# Patient Record
Sex: MALE | Race: WHITE | NOT HISPANIC OR LATINO | Employment: FULL TIME | URBAN - METROPOLITAN AREA
[De-identification: names, ages, dates, MRNs, and addresses within clinical notes are randomized per-mention and may not be internally consistent; named-entity substitution may affect disease eponyms.]

---

## 2017-04-19 ENCOUNTER — TELEPHONE (OUTPATIENT)
Dept: MEDICAL GROUP | Facility: PHYSICIAN GROUP | Age: 57
End: 2017-04-19

## 2017-04-19 DIAGNOSIS — E55.9 VITAMIN D DEFICIENCY DISEASE: ICD-10-CM

## 2017-04-19 DIAGNOSIS — R73.9 HYPERGLYCEMIA: ICD-10-CM

## 2017-04-19 DIAGNOSIS — Z00.00 ROUTINE HEALTH MAINTENANCE: ICD-10-CM

## 2017-04-19 DIAGNOSIS — Z12.5 SCREENING FOR PROSTATE CANCER: ICD-10-CM

## 2017-04-19 DIAGNOSIS — E78.2 MIXED HYPERLIPIDEMIA: ICD-10-CM

## 2017-04-19 DIAGNOSIS — I10 ESSENTIAL HYPERTENSION: ICD-10-CM

## 2017-04-19 NOTE — TELEPHONE ENCOUNTER
1. Caller Name: Milton Beal                                         Call Back Number: [phone] (home)       Patient approves a detailed voicemail message: N\A    2. SPECIFIC Action To Be Taken: Blood work    3. Diagnosis/Clinical Reason for Request: Next visit in June    4. Specialty & Provider Name/Lab/Imaging Location: [facility]    5. Is appointment scheduled for requested order/referral: no    Patient informed they will receive a return phone call from the office ONLY if there are any questions before processing their request. Advised to call back if they haven't received a call from the referral department in 5 days.

## 2017-06-17 ENCOUNTER — HOSPITAL ENCOUNTER (OUTPATIENT)
Dept: LAB | Facility: MEDICAL CENTER | Age: 57
End: 2017-06-17
Attending: FAMILY MEDICINE
Payer: COMMERCIAL

## 2017-06-17 DIAGNOSIS — E78.2 MIXED HYPERLIPIDEMIA: ICD-10-CM

## 2017-06-17 DIAGNOSIS — R73.9 HYPERGLYCEMIA: ICD-10-CM

## 2017-06-17 DIAGNOSIS — I10 ESSENTIAL HYPERTENSION: ICD-10-CM

## 2017-06-17 DIAGNOSIS — Z00.00 ROUTINE HEALTH MAINTENANCE: ICD-10-CM

## 2017-06-17 LAB
ALBUMIN SERPL BCP-MCNC: 4.6 G/DL (ref 3.2–4.9)
ALBUMIN/GLOB SERPL: 1.6 G/DL
ALP SERPL-CCNC: 81 U/L (ref 30–99)
ALT SERPL-CCNC: 32 U/L (ref 2–50)
ANION GAP SERPL CALC-SCNC: 7 MMOL/L (ref 0–11.9)
AST SERPL-CCNC: 24 U/L (ref 12–45)
BASOPHILS # BLD AUTO: 0.9 % (ref 0–1.8)
BASOPHILS # BLD: 0.05 K/UL (ref 0–0.12)
BILIRUB SERPL-MCNC: 0.7 MG/DL (ref 0.1–1.5)
BUN SERPL-MCNC: 13 MG/DL (ref 8–22)
CALCIUM SERPL-MCNC: 9.6 MG/DL (ref 8.5–10.5)
CHLORIDE SERPL-SCNC: 104 MMOL/L (ref 96–112)
CHOLEST SERPL-MCNC: 159 MG/DL (ref 100–199)
CO2 SERPL-SCNC: 28 MMOL/L (ref 20–33)
CREAT SERPL-MCNC: 1.05 MG/DL (ref 0.5–1.4)
CREAT UR-MCNC: 173.2 MG/DL
EOSINOPHIL # BLD AUTO: 0.07 K/UL (ref 0–0.51)
EOSINOPHIL NFR BLD: 1.2 % (ref 0–6.9)
ERYTHROCYTE [DISTWIDTH] IN BLOOD BY AUTOMATED COUNT: 40.9 FL (ref 35.9–50)
EST. AVERAGE GLUCOSE BLD GHB EST-MCNC: 169 MG/DL
GFR SERPL CREATININE-BSD FRML MDRD: >60 ML/MIN/1.73 M 2
GLOBULIN SER CALC-MCNC: 2.8 G/DL (ref 1.9–3.5)
GLUCOSE SERPL-MCNC: 148 MG/DL (ref 65–99)
HBA1C MFR BLD: 7.5 % (ref 0–5.6)
HCT VFR BLD AUTO: 45.2 % (ref 42–52)
HDLC SERPL-MCNC: 55 MG/DL
HGB BLD-MCNC: 14.9 G/DL (ref 14–18)
IMM GRANULOCYTES # BLD AUTO: 0.03 K/UL (ref 0–0.11)
IMM GRANULOCYTES NFR BLD AUTO: 0.5 % (ref 0–0.9)
LDLC SERPL CALC-MCNC: 70 MG/DL
LYMPHOCYTES # BLD AUTO: 1.69 K/UL (ref 1–4.8)
LYMPHOCYTES NFR BLD: 29.2 % (ref 22–41)
MCH RBC QN AUTO: 29.7 PG (ref 27–33)
MCHC RBC AUTO-ENTMCNC: 33 G/DL (ref 33.7–35.3)
MCV RBC AUTO: 90 FL (ref 81.4–97.8)
MICROALBUMIN UR-MCNC: 2.3 MG/DL
MICROALBUMIN/CREAT UR: 13 MG/G (ref 0–30)
MONOCYTES # BLD AUTO: 0.5 K/UL (ref 0–0.85)
MONOCYTES NFR BLD AUTO: 8.7 % (ref 0–13.4)
NEUTROPHILS # BLD AUTO: 3.44 K/UL (ref 1.82–7.42)
NEUTROPHILS NFR BLD: 59.5 % (ref 44–72)
NRBC # BLD AUTO: 0 K/UL
NRBC BLD AUTO-RTO: 0 /100 WBC
PLATELET # BLD AUTO: 179 K/UL (ref 164–446)
PMV BLD AUTO: 12 FL (ref 9–12.9)
POTASSIUM SERPL-SCNC: 4.6 MMOL/L (ref 3.6–5.5)
PROT SERPL-MCNC: 7.4 G/DL (ref 6–8.2)
RBC # BLD AUTO: 5.02 M/UL (ref 4.7–6.1)
SODIUM SERPL-SCNC: 139 MMOL/L (ref 135–145)
TRIGL SERPL-MCNC: 172 MG/DL (ref 0–149)
TSH SERPL DL<=0.005 MIU/L-ACNC: 1.57 UIU/ML (ref 0.3–3.7)
WBC # BLD AUTO: 5.8 K/UL (ref 4.8–10.8)

## 2017-06-17 PROCEDURE — 83036 HEMOGLOBIN GLYCOSYLATED A1C: CPT

## 2017-06-17 PROCEDURE — 85025 COMPLETE CBC W/AUTO DIFF WBC: CPT

## 2017-06-17 PROCEDURE — 80053 COMPREHEN METABOLIC PANEL: CPT

## 2017-06-17 PROCEDURE — 82043 UR ALBUMIN QUANTITATIVE: CPT

## 2017-06-17 PROCEDURE — 82570 ASSAY OF URINE CREATININE: CPT

## 2017-06-17 PROCEDURE — 80061 LIPID PANEL: CPT

## 2017-06-17 PROCEDURE — 36415 COLL VENOUS BLD VENIPUNCTURE: CPT

## 2017-06-17 PROCEDURE — 84443 ASSAY THYROID STIM HORMONE: CPT

## 2017-06-27 ENCOUNTER — OFFICE VISIT (OUTPATIENT)
Dept: MEDICAL GROUP | Facility: PHYSICIAN GROUP | Age: 57
End: 2017-06-27
Payer: COMMERCIAL

## 2017-06-27 VITALS
BODY MASS INDEX: 31.08 KG/M2 | DIASTOLIC BLOOD PRESSURE: 64 MMHG | RESPIRATION RATE: 14 BRPM | SYSTOLIC BLOOD PRESSURE: 138 MMHG | HEART RATE: 78 BPM | HEIGHT: 67 IN | OXYGEN SATURATION: 96 % | WEIGHT: 198 LBS | TEMPERATURE: 97.5 F

## 2017-06-27 DIAGNOSIS — E78.2 MIXED HYPERLIPIDEMIA: ICD-10-CM

## 2017-06-27 DIAGNOSIS — E55.9 VITAMIN D DEFICIENCY DISEASE: ICD-10-CM

## 2017-06-27 DIAGNOSIS — R73.9 HYPERGLYCEMIA: ICD-10-CM

## 2017-06-27 DIAGNOSIS — I10 ESSENTIAL HYPERTENSION: ICD-10-CM

## 2017-06-27 PROCEDURE — 99214 OFFICE O/P EST MOD 30 MIN: CPT | Performed by: FAMILY MEDICINE

## 2017-06-28 NOTE — PROGRESS NOTES
Patient comes in to review recent lab work. He admits to eating more sugar and carbohydrates in the evenings and he knows he is supposed to. He says he feels well.  He wants to go with increased exercise and dietary changes as much as possible. He would like to see or dietitian here but is not ready to go to a formal diabetes education program right now he says.      Review of Systems   Constitutional: Negative.  Negative for fever, chills, weight loss and malaise/fatigue.   HENT: Negative for hearing loss, ear pain, congestion, sore throat, neck pain and tinnitus.    Eyes: Negative for blurred vision, double vision and pain.   Respiratory: Negative for cough, hemoptysis, shortness of breath and wheezing.    Cardiovascular: Negative for chest pain, palpitations, orthopnea, claudication, leg swelling and PND.   Gastrointestinal: Negative for heartburn, nausea, vomiting, abdominal pain, diarrhea, constipation, blood in stool and melena.   Genitourinary: Negative for incontinence, dysuria, urgency, frequency and hematuria. Male--negative for erectile dysfunction  Musculoskeletal: Negative for myalgias, back pain and joint pain.   Skin: Negative for rash and itching. No lesions that will not heal.  Neurological: Negative for dizziness, tingling, tremors, focal weakness, seizures, loss of consciousness and headaches.   Endo/Heme/Allergies: Negative for environmental allergies and polydipsia.  Does not bruise/bleed easily.   Psychiatric/Behavioral: Negative for depression, memory loss and substance abuse.  The patient is not nervous/anxious and does not have insomnia.  All others negative.     I reviewed the following    Past Medical History   Diagnosis Date   • Hypertension    • Hyperlipidemia         Past Surgical History   Procedure Laterality Date   • Elbowplasty         No Known Allergies    Current Outpatient Prescriptions   Medication Sig Dispense Refill   • metformin (GLUCOPHAGE) 500 MG Tab Take 1 Tab by mouth 2  times a day, with meals. 180 Tab 3   • atorvastatin (LIPITOR) 20 MG Tab TAKE 1 TABLET BY MOUTH EVERY NIGHT AT BEDTIME 90 Tab 3   • valsartan (DIOVAN) 320 MG tablet Take 1 Tab by mouth every day. 90 Tab 3   • vitamin D3, cholecalciferol, 1000 UNIT TABS Take 2 Tabs by mouth every day. 100 Tab 3   • sildenafil citrate (VIAGRA) 100 MG tablet Take 1 Tab by mouth as needed for Erectile Dysfunction. 20 Tab 3     No current facility-administered medications for this visit.        Family History   Problem Relation Age of Onset   • Cancer Father      Prostate Cancer   • Lung Disease Father      COPD       Social History     Social History   • Marital Status:      Spouse Name: N/A   • Number of Children: N/A   • Years of Education: N/A     Occupational History   • Not on file.     Social History Main Topics   • Smoking status: Never Smoker    • Smokeless tobacco: Never Used   • Alcohol Use: 3.5 oz/week     7 Glasses of wine per week   • Drug Use: No   • Sexual Activity:     Partners: Female     Other Topics Concern   • Not on file     Social History Narrative          Hospital Outpatient Visit on 06/17/2017   Component Date Value   • WBC 06/17/2017 5.8    • RBC 06/17/2017 5.02    • Hemoglobin 06/17/2017 14.9    • Hematocrit 06/17/2017 45.2    • MCV 06/17/2017 90.0    • MCH 06/17/2017 29.7    • MCHC 06/17/2017 33.0*   • RDW 06/17/2017 40.9    • Platelet Count 06/17/2017 179    • MPV 06/17/2017 12.0    • Neutrophils-Polys 06/17/2017 59.50    • Lymphocytes 06/17/2017 29.20    • Monocytes 06/17/2017 8.70    • Eosinophils 06/17/2017 1.20    • Basophils 06/17/2017 0.90    • Immature Granulocytes 06/17/2017 0.50    • Nucleated RBC 06/17/2017 0.00    • Neutrophils (Absolute) 06/17/2017 3.44    • Lymphs (Absolute) 06/17/2017 1.69    • Monos (Absolute) 06/17/2017 0.50    • Eos (Absolute) 06/17/2017 0.07    • Baso (Absolute) 06/17/2017 0.05    • Immature Granulocytes (a* 06/17/2017 0.03    • NRBC (Absolute) 06/17/2017 0.00    •  Sodium 06/17/2017 139    • Potassium 06/17/2017 4.6    • Chloride 06/17/2017 104    • Co2 06/17/2017 28    • Anion Gap 06/17/2017 7.0    • Glucose 06/17/2017 148*   • Bun 06/17/2017 13    • Creatinine 06/17/2017 1.05    • Calcium 06/17/2017 9.6    • AST(SGOT) 06/17/2017 24    • ALT(SGPT) 06/17/2017 32    • Alkaline Phosphatase 06/17/2017 81    • Total Bilirubin 06/17/2017 0.7    • Albumin 06/17/2017 4.6    • Total Protein 06/17/2017 7.4    • Globulin 06/17/2017 2.8    • A-G Ratio 06/17/2017 1.6    • Glycohemoglobin 06/17/2017 7.5*   • Est Avg Glucose 06/17/2017 169    • Creatinine, Urine 06/17/2017 173.20    • Microalbumin, Urine Rand* 06/17/2017 2.3    • Micro Alb Creat Ratio 06/17/2017 13    • Cholesterol,Tot 06/17/2017 159    • Triglycerides 06/17/2017 172*   • HDL 06/17/2017 55    • LDL 06/17/2017 70    • TSH 06/17/2017 1.570    • GFR If  06/17/2017 >60    • GFR If Non  Ameri* 06/17/2017 >60      Physical Exam   Constitutional: He is oriented. He appears well-developed and well-nourished. No distress.   HENT:   Head: Normocephalic and atraumatic.   Right Ear: External ear normal. Ear canal and TM normal   Left Ear: External ear normal. Ear canal and TM normal   Nose: Nose normal.   Mouth/Throat: Oropharynx is clear and moist.   Eyes: Conjunctivae and extraocular motions are normal. Pupils are equal, round, and reactive to light.        Fundi benign bilaterally   Neck: No thyromegaly present.   Cardiovascular: Normal rate, regular rhythm, normal heart sounds and intact distal pulses.  Exam reveals no gallop.    No murmur heard.  Pulmonary/Chest: Effort normal and breath sounds normal. No respiratory distress. He has no wheezes. He has no rales.   Abdominal: Soft. Bowel sounds are normal. No hepatosplenomegaly. He exhibits no distension. No tenderness. He has no rebound and no guarding.   Musculoskeletal: Normal range of motion. He exhibits no edema and no tenderness.   Lymphadenopathy:      He has no cervical adenopathy.  No supraclavicular adenopathy.   Neurological: He is alert and oriented. He has normal reflexes.        Babinskis downgoing bilaterally--The patient has intact sensation to monofilament light touch over both feet. No sores or ulcers are noted over the feet.     Skin: Skin is warm and dry. No rash noted. No erythema.   Psychiatric: He has a normal mood and appropriate affect. His behavior is normal. Judgment and thought content normal.      1. Hyperglycemia --we need better control of this  metformin (GLUCOPHAGE) 500 MG Tab--increase to one by mouth twice a day with food     REFERRAL TO [facility] IMPROVEMENT PROGRAMS (HIP) Services Requested:: Registered Dietitian for Medical Nutrition Therapy; Reason for Visit:: Medical Condition Requiring Nutrition Counseling    Diabetic Monofilament LE Exam   2. Essential hypertension   controlled    3. Mixed hyperlipidemia   doing well    4. Vitamin D deficiency disease   doing well      Recheck with me 3 months or when necessary    Please note that this dictation was created using voice recognition software. I have worked with consultants from the vendor as well as technical experts from [employer] to optimize the interface. I have made every reasonable attempt to correct obvious errors, but I expect that there are errors of grammar and possibly content that I did not discover before finalizing the note.

## 2017-06-28 NOTE — PATIENT INSTRUCTIONS
Patient given written instructions regarding labs, imaging, medications, referrals, dietary and lifestyle management, and return visit.    Figueroa Lacey MD

## 2017-07-31 DIAGNOSIS — I10 ESSENTIAL HYPERTENSION: ICD-10-CM

## 2017-08-01 RX ORDER — VALSARTAN 320 MG/1
320 TABLET ORAL DAILY
Qty: 90 TAB | Refills: 3 | Status: SHIPPED | OUTPATIENT
Start: 2017-08-01 | End: 2018-05-23 | Stop reason: SDUPTHER

## 2017-10-08 DIAGNOSIS — E78.2 MIXED HYPERLIPIDEMIA: ICD-10-CM

## 2017-10-08 RX ORDER — ATORVASTATIN CALCIUM 20 MG/1
TABLET, FILM COATED ORAL
Qty: 90 TAB | Refills: 3 | Status: SHIPPED | OUTPATIENT
Start: 2017-10-08 | End: 2018-07-11 | Stop reason: SDUPTHER

## 2018-01-29 DIAGNOSIS — J11.1 FLU: ICD-10-CM

## 2018-01-29 RX ORDER — OSELTAMIVIR PHOSPHATE 75 MG/1
75 CAPSULE ORAL 2 TIMES DAILY
Qty: 10 CAP | Refills: 0 | Status: SHIPPED | OUTPATIENT
Start: 2018-01-29 | End: 2018-07-11

## 2018-05-15 DIAGNOSIS — E55.9 VITAMIN D DEFICIENCY DISEASE: ICD-10-CM

## 2018-05-15 DIAGNOSIS — I10 ESSENTIAL HYPERTENSION: ICD-10-CM

## 2018-05-15 DIAGNOSIS — R73.9 HYPERGLYCEMIA: ICD-10-CM

## 2018-05-15 DIAGNOSIS — E78.2 MIXED HYPERLIPIDEMIA: ICD-10-CM

## 2018-05-15 DIAGNOSIS — Z12.5 PROSTATE CANCER SCREENING: ICD-10-CM

## 2018-05-23 DIAGNOSIS — I10 ESSENTIAL HYPERTENSION: ICD-10-CM

## 2018-05-24 RX ORDER — VALSARTAN 320 MG/1
TABLET ORAL
Qty: 90 TAB | Refills: 2 | Status: SHIPPED | OUTPATIENT
Start: 2018-05-24 | End: 2019-04-05

## 2018-05-24 NOTE — TELEPHONE ENCOUNTER
Requested Prescriptions     Signed Prescriptions Disp Refills   • valsartan (DIOVAN) 320 MG tablet 90 Tab 2     Sig: TAKE 1 TABLET BY MOUTH EVERY DAY     Authorizing Provider: GORDON RAMESH A.P.R.N.

## 2018-06-09 ENCOUNTER — HOSPITAL ENCOUNTER (OUTPATIENT)
Dept: LAB | Facility: MEDICAL CENTER | Age: 58
End: 2018-06-09
Attending: FAMILY MEDICINE
Payer: COMMERCIAL

## 2018-06-09 DIAGNOSIS — I10 ESSENTIAL HYPERTENSION: ICD-10-CM

## 2018-06-09 DIAGNOSIS — E78.2 MIXED HYPERLIPIDEMIA: ICD-10-CM

## 2018-06-09 DIAGNOSIS — Z12.5 PROSTATE CANCER SCREENING: ICD-10-CM

## 2018-06-09 DIAGNOSIS — R73.9 HYPERGLYCEMIA: ICD-10-CM

## 2018-06-09 LAB
25(OH)D3 SERPL-MCNC: 23 NG/ML (ref 30–100)
ALBUMIN SERPL BCP-MCNC: 4.7 G/DL (ref 3.2–4.9)
ALBUMIN/GLOB SERPL: 1.6 G/DL
ALP SERPL-CCNC: 85 U/L (ref 30–99)
ALT SERPL-CCNC: 50 U/L (ref 2–50)
AMORPH CRY #/AREA URNS HPF: PRESENT /HPF
ANION GAP SERPL CALC-SCNC: 10 MMOL/L (ref 0–11.9)
APPEARANCE UR: ABNORMAL
AST SERPL-CCNC: 38 U/L (ref 12–45)
BACTERIA #/AREA URNS HPF: NEGATIVE /HPF
BASOPHILS # BLD AUTO: 0.8 % (ref 0–1.8)
BASOPHILS # BLD: 0.05 K/UL (ref 0–0.12)
BILIRUB SERPL-MCNC: 1 MG/DL (ref 0.1–1.5)
BILIRUB UR QL STRIP.AUTO: NEGATIVE
BUN SERPL-MCNC: 12 MG/DL (ref 8–22)
CALCIUM SERPL-MCNC: 9.9 MG/DL (ref 8.5–10.5)
CHLORIDE SERPL-SCNC: 103 MMOL/L (ref 96–112)
CHOLEST SERPL-MCNC: 158 MG/DL (ref 100–199)
CO2 SERPL-SCNC: 27 MMOL/L (ref 20–33)
COLOR UR: ABNORMAL
CREAT SERPL-MCNC: 0.97 MG/DL (ref 0.5–1.4)
CREAT UR-MCNC: 450 MG/DL
EOSINOPHIL # BLD AUTO: 0.04 K/UL (ref 0–0.51)
EOSINOPHIL NFR BLD: 0.6 % (ref 0–6.9)
EPI CELLS #/AREA URNS HPF: NORMAL /HPF
ERYTHROCYTE [DISTWIDTH] IN BLOOD BY AUTOMATED COUNT: 40.7 FL (ref 35.9–50)
EST. AVERAGE GLUCOSE BLD GHB EST-MCNC: 160 MG/DL
GLOBULIN SER CALC-MCNC: 3 G/DL (ref 1.9–3.5)
GLUCOSE SERPL-MCNC: 165 MG/DL (ref 65–99)
GLUCOSE UR STRIP.AUTO-MCNC: NEGATIVE MG/DL
HBA1C MFR BLD: 7.2 % (ref 0–5.6)
HCT VFR BLD AUTO: 47.1 % (ref 42–52)
HDLC SERPL-MCNC: 55 MG/DL
HGB BLD-MCNC: 16 G/DL (ref 14–18)
IMM GRANULOCYTES # BLD AUTO: 0.02 K/UL (ref 0–0.11)
IMM GRANULOCYTES NFR BLD AUTO: 0.3 % (ref 0–0.9)
KETONES UR STRIP.AUTO-MCNC: ABNORMAL MG/DL
LDLC SERPL CALC-MCNC: 71 MG/DL
LEUKOCYTE ESTERASE UR QL STRIP.AUTO: NEGATIVE
LYMPHOCYTES # BLD AUTO: 1.52 K/UL (ref 1–4.8)
LYMPHOCYTES NFR BLD: 24.7 % (ref 22–41)
MCH RBC QN AUTO: 31.4 PG (ref 27–33)
MCHC RBC AUTO-ENTMCNC: 34 G/DL (ref 33.7–35.3)
MCV RBC AUTO: 92.4 FL (ref 81.4–97.8)
MICRO URNS: ABNORMAL
MICROALBUMIN UR-MCNC: 11.5 MG/DL
MICROALBUMIN/CREAT UR: 26 MG/G (ref 0–30)
MONOCYTES # BLD AUTO: 0.4 K/UL (ref 0–0.85)
MONOCYTES NFR BLD AUTO: 6.5 % (ref 0–13.4)
NEUTROPHILS # BLD AUTO: 4.13 K/UL (ref 1.82–7.42)
NEUTROPHILS NFR BLD: 67.1 % (ref 44–72)
NITRITE UR QL STRIP.AUTO: NEGATIVE
NRBC # BLD AUTO: 0 K/UL
NRBC BLD-RTO: 0 /100 WBC
PH UR STRIP.AUTO: 5 [PH]
PLATELET # BLD AUTO: 173 K/UL (ref 164–446)
PMV BLD AUTO: 12 FL (ref 9–12.9)
POTASSIUM SERPL-SCNC: 4.4 MMOL/L (ref 3.6–5.5)
PROT SERPL-MCNC: 7.7 G/DL (ref 6–8.2)
PROT UR QL STRIP: 30 MG/DL
PSA SERPL-MCNC: 0.98 NG/ML (ref 0–4)
RBC # BLD AUTO: 5.1 M/UL (ref 4.7–6.1)
RBC UR QL AUTO: NEGATIVE
SODIUM SERPL-SCNC: 140 MMOL/L (ref 135–145)
SP GR UR STRIP.AUTO: 1.03
TRIGL SERPL-MCNC: 158 MG/DL (ref 0–149)
UROBILINOGEN UR STRIP.AUTO-MCNC: 0.2 MG/DL
WBC # BLD AUTO: 6.2 K/UL (ref 4.8–10.8)

## 2018-06-09 PROCEDURE — 84153 ASSAY OF PSA TOTAL: CPT

## 2018-06-09 PROCEDURE — 85025 COMPLETE CBC W/AUTO DIFF WBC: CPT

## 2018-06-09 PROCEDURE — 82570 ASSAY OF URINE CREATININE: CPT

## 2018-06-09 PROCEDURE — 81001 URINALYSIS AUTO W/SCOPE: CPT

## 2018-06-09 PROCEDURE — 82043 UR ALBUMIN QUANTITATIVE: CPT

## 2018-06-09 PROCEDURE — 83036 HEMOGLOBIN GLYCOSYLATED A1C: CPT

## 2018-06-09 PROCEDURE — 80053 COMPREHEN METABOLIC PANEL: CPT

## 2018-06-09 PROCEDURE — 36415 COLL VENOUS BLD VENIPUNCTURE: CPT

## 2018-06-09 PROCEDURE — 82306 VITAMIN D 25 HYDROXY: CPT

## 2018-06-09 PROCEDURE — 80061 LIPID PANEL: CPT

## 2018-06-11 DIAGNOSIS — E55.9 VITAMIN D DEFICIENCY DISEASE: ICD-10-CM

## 2018-06-11 DIAGNOSIS — R73.9 HYPERGLYCEMIA: ICD-10-CM

## 2018-06-11 RX ORDER — MELATONIN
3000 DAILY
Qty: 100 TAB | Refills: 3
Start: 2018-06-11 | End: 2019-09-24

## 2018-07-11 ENCOUNTER — OFFICE VISIT (OUTPATIENT)
Dept: MEDICAL GROUP | Facility: PHYSICIAN GROUP | Age: 58
End: 2018-07-11
Payer: COMMERCIAL

## 2018-07-11 VITALS
WEIGHT: 190 LBS | HEIGHT: 67 IN | DIASTOLIC BLOOD PRESSURE: 60 MMHG | HEART RATE: 74 BPM | TEMPERATURE: 96.8 F | OXYGEN SATURATION: 93 % | SYSTOLIC BLOOD PRESSURE: 122 MMHG | RESPIRATION RATE: 14 BRPM | BODY MASS INDEX: 29.82 KG/M2

## 2018-07-11 DIAGNOSIS — Z00.00 HEALTH CARE MAINTENANCE: ICD-10-CM

## 2018-07-11 DIAGNOSIS — H04.123 BILATERAL DRY EYES: ICD-10-CM

## 2018-07-11 DIAGNOSIS — E55.9 VITAMIN D DEFICIENCY DISEASE: ICD-10-CM

## 2018-07-11 DIAGNOSIS — I10 ESSENTIAL HYPERTENSION: ICD-10-CM

## 2018-07-11 DIAGNOSIS — R73.9 HYPERGLYCEMIA: ICD-10-CM

## 2018-07-11 DIAGNOSIS — E78.2 MIXED HYPERLIPIDEMIA: ICD-10-CM

## 2018-07-11 PROCEDURE — 99396 PREV VISIT EST AGE 40-64: CPT | Performed by: FAMILY MEDICINE

## 2018-07-11 RX ORDER — ATORVASTATIN CALCIUM 20 MG/1
TABLET, FILM COATED ORAL
Qty: 90 TAB | Refills: 3 | Status: SHIPPED | OUTPATIENT
Start: 2018-07-11 | End: 2019-07-14 | Stop reason: SDUPTHER

## 2018-07-11 ASSESSMENT — PATIENT HEALTH QUESTIONNAIRE - PHQ9: CLINICAL INTERPRETATION OF PHQ2 SCORE: 0

## 2018-07-12 NOTE — PROGRESS NOTES
Patient comes in for annual exam.  His only complaint is one of dry eyes.  His daughter, who is a plastic surgeon in [place] told him that he needed to be referred to an ophthalmologist.  He has tried all sorts of over-the-counter artificial tears and drops without good results.  The patient is here to review his recent labs.  He feels well.      Review of Systems   Constitutional: Negative.  Negative for fever, chills, weight loss and malaise/fatigue.   HENT: Negative for hearing loss, ear pain, congestion, sore throat, neck pain and tinnitus.    Eyes: He complains of bilateral dry eyes   negative for blurred vision, double vision and pain.   Respiratory: Negative for cough, hemoptysis, shortness of breath and wheezing.    Cardiovascular: Negative for chest pain, palpitations, orthopnea, claudication, leg swelling and PND.   Gastrointestinal: Negative for heartburn, nausea, vomiting, abdominal pain, diarrhea, constipation, blood in stool and melena.   Genitourinary: Negative for incontinence, dysuria, urgency, frequency and hematuria.  Male--negative for erectile dysfunction  Musculoskeletal: Negative for myalgias, back pain and joint pain.   Skin: Negative for rash and itching. No lesions that will not heal.  Neurological: Negative for dizziness, tingling, tremors, focal weakness, seizures, loss of consciousness and headaches.   Endo/Heme/Allergies: Negative for environmental allergies and polydipsia.  Does not bruise/bleed easily.   Psychiatric/Behavioral: Negative for depression, memory loss and substance abuse.  The patient is not nervous/anxious and does not have insomnia.  All others negative.    I reviewed the following    Past Medical History:   Diagnosis Date   • Hyperlipidemia    • Hypertension         Past Surgical History:   Procedure Laterality Date   • ELBOWPLASTY         No Known Allergies    Current Outpatient Prescriptions   Medication Sig Dispense Refill   • atorvastatin (LIPITOR) 20 MG Tab TAKE 1  TABLET BY MOUTH EVERY NIGHT AT BEDTIME 90 Tab 3   • metFORMIN (GLUCOPHAGE) 500 MG Tab Take 2 Tabs by mouth 2 times a day, with meals. 360 Tab 3   • vitamin D3, cholecalciferol, 1000 UNIT Tab Take 3 Tabs by mouth every day. 100 Tab 3   • valsartan (DIOVAN) 320 MG tablet TAKE 1 TABLET BY MOUTH EVERY DAY 90 Tab 2   • sildenafil citrate (VIAGRA) 100 MG tablet Take 1 Tab by mouth as needed for Erectile Dysfunction. 20 Tab 3     No current facility-administered medications for this visit.         Family History   Problem Relation Age of Onset   • Cancer Father      Prostate Cancer   • Lung Disease Father      COPD       Social History     Social History   • Marital status:      Spouse name: N/A   • Number of children: N/A   • Years of education: N/A     Occupational History   • Not on file.     Social History Main Topics   • Smoking status: Never Smoker   • Smokeless tobacco: Never Used   • Alcohol use 3.5 oz/week     7 Glasses of wine per week   • Drug use: No   • Sexual activity: Yes     Partners: Female     Other Topics Concern   • Not on file     Social History Narrative   • No narrative on file      No visits with results within 1 Month(s) from this visit.   Latest known visit with results is:   Hospital Outpatient Visit on 06/09/2018   Component Date Value   • WBC 06/09/2018 6.2    • RBC 06/09/2018 5.10    • Hemoglobin 06/09/2018 16.0    • Hematocrit 06/09/2018 47.1    • MCV 06/09/2018 92.4    • MCH 06/09/2018 31.4    • MCHC 06/09/2018 34.0    • RDW 06/09/2018 40.7    • Platelet Count 06/09/2018 173    • MPV 06/09/2018 12.0    • Neutrophils-Polys 06/09/2018 67.10    • Lymphocytes 06/09/2018 24.70    • Monocytes 06/09/2018 6.50    • Eosinophils 06/09/2018 0.60    • Basophils 06/09/2018 0.80    • Immature Granulocytes 06/09/2018 0.30    • Nucleated RBC 06/09/2018 0.00    • Neutrophils (Absolute) 06/09/2018 4.13    • Lymphs (Absolute) 06/09/2018 1.52    • Monos (Absolute) 06/09/2018 0.40    • Eos (Absolute)  06/09/2018 0.04    • Baso (Absolute) 06/09/2018 0.05    • Immature Granulocytes (a* 06/09/2018 0.02    • NRBC (Absolute) 06/09/2018 0.00    • Sodium 06/09/2018 140    • Potassium 06/09/2018 4.4    • Chloride 06/09/2018 103    • Co2 06/09/2018 27    • Anion Gap 06/09/2018 10.0    • Glucose 06/09/2018 165*   • Bun 06/09/2018 12    • Creatinine 06/09/2018 0.97    • Calcium 06/09/2018 9.9    • AST(SGOT) 06/09/2018 38    • ALT(SGPT) 06/09/2018 50    • Alkaline Phosphatase 06/09/2018 85    • Total Bilirubin 06/09/2018 1.0    • Albumin 06/09/2018 4.7    • Total Protein 06/09/2018 7.7    • Globulin 06/09/2018 3.0    • A-G Ratio 06/09/2018 1.6    • Cholesterol,Tot 06/09/2018 158    • Triglycerides 06/09/2018 158*   • HDL 06/09/2018 55    • LDL 06/09/2018 71    • Creatinine, Urine 06/09/2018 450.00    • Microalbumin, Urine Rand* 06/09/2018 11.5    • Micro Alb Creat Ratio 06/09/2018 26    • Glycohemoglobin 06/09/2018 7.2*   • Est Avg Glucose 06/09/2018 160    • Color 06/09/2018 DK Yellow    • Character 06/09/2018 Turbid*   • Specific Gravity 06/09/2018 1.029    • Ph 06/09/2018 5.0    • Glucose 06/09/2018 Negative    • Ketones 06/09/2018 Trace*   • Protein 06/09/2018 30*   • Bilirubin 06/09/2018 Negative    • Urobilinogen, Urine 06/09/2018 0.2    • Nitrite 06/09/2018 Negative    • Leukocyte Esterase 06/09/2018 Negative    • Occult Blood 06/09/2018 Negative    • Micro Urine Req 06/09/2018 Microscopic    • Prostatic Specific Antig* 06/09/2018 0.98    • 25-Hydroxy   Vitamin D 25 06/09/2018 23*   • Bacteria 06/09/2018 Negative    • Epithelial Cells 06/09/2018 Rare    • Amorphous Crystal 06/09/2018 Present    • GFR If  06/09/2018 >60    • GFR If Non  Ameri* 06/09/2018 >60      Physical Exam   Constitutional: He is oriented. He appears well-developed and well-nourished. No distress.   HENT:   Head: Normocephalic and atraumatic.   Right Ear: External ear normal. Ear canal and TM normal   Left Ear: External ear  normal. Ear canal and TM normal   Nose: Nose normal.   Mouth/Throat: Oropharynx is clear and moist.   Eyes: Conjunctivae and extraocular motions are normal. Pupils are equal, round, and reactive to light.        Fundi benign bilaterally   Neck: No thyromegaly present.   Cardiovascular: Normal rate, regular rhythm, normal heart sounds and intact distal pulses.  Exam reveals no gallop.    No murmur heard.  Pulmonary/Chest: Effort normal and breath sounds normal. No respiratory distress. He has no wheezes. He has no rales.   Abdominal: Soft. Bowel sounds are normal. No hepatosplenomegaly. He exhibits no distension. No tenderness. He has no rebound and no guarding.   I offered to check his prostate but he refused.  Musculoskeletal: Normal range of motion. He exhibits no edema and no tenderness.   Lymphadenopathy:     He has no cervical adenopathy.  No supraclavicular adenopathy.   Neurological: He is alert and oriented. He has normal reflexes.        Babinskis downgoing bilaterally   Skin: Skin is warm and dry. No rash noted. No erythema.   Psychiatric: He has a normal mood and appropriate affect. His behavior is normal. Judgment and thought content normal.    1. Health care maintenance   patient is doing well    2. Hyperglycemia   we have increased the patient's metformin to 500 mg 2 in the morning and 1 in the evening with meals.   3. Vitamin D deficiency disease   doing well   4. Essential hypertension   controlled   5. Mixed hyperlipidemia  atorvastatin (LIPITOR) 20 MG Tab--refill   6. Bilateral dry eyes  REFERRAL TO OPHTHALMOLOGY--Dr. Richard at [facility]     Please note that this dictation was created using voice recognition software. I have worked with consultants from the vendor as well as technical experts from [facility] [employer] to optimize the interface. I have made every reasonable attempt to correct obvious errors, but I expect that there are errors of grammar and possibly content that I did not  discover before finalizing the note.    Recheck 1 year or as needed

## 2019-01-21 RX ORDER — TELMISARTAN 80 MG/1
TABLET ORAL
Qty: 90 TAB | Refills: 3 | Status: SHIPPED | OUTPATIENT
Start: 2019-01-21 | End: 2019-07-16 | Stop reason: SDUPTHER

## 2019-04-05 ENCOUNTER — OFFICE VISIT (OUTPATIENT)
Dept: MEDICAL GROUP | Facility: PHYSICIAN GROUP | Age: 59
End: 2019-04-05
Payer: COMMERCIAL

## 2019-04-05 VITALS
SYSTOLIC BLOOD PRESSURE: 110 MMHG | HEIGHT: 67 IN | TEMPERATURE: 97.8 F | HEART RATE: 67 BPM | OXYGEN SATURATION: 94 % | WEIGHT: 162 LBS | BODY MASS INDEX: 25.43 KG/M2 | DIASTOLIC BLOOD PRESSURE: 72 MMHG

## 2019-04-05 DIAGNOSIS — J01.00 ACUTE NON-RECURRENT MAXILLARY SINUSITIS: ICD-10-CM

## 2019-04-05 PROCEDURE — 99214 OFFICE O/P EST MOD 30 MIN: CPT | Performed by: NURSE PRACTITIONER

## 2019-04-05 RX ORDER — AMOXICILLIN AND CLAVULANATE POTASSIUM 875; 125 MG/1; MG/1
1 TABLET, FILM COATED ORAL 2 TIMES DAILY
Qty: 14 TAB | Refills: 0 | Status: SHIPPED | OUTPATIENT
Start: 2019-04-05 | End: 2019-07-16

## 2019-04-05 NOTE — PROGRESS NOTES
"  Subjective:     Chief Complaint   Patient presents with   • Sinus Problem     x2weeks/pressure/bloody nose       HPI  Milton Beal is a 58 y.o. male here today for sinus infection. Symptoms started 2 weeks ago with phlegm and congestion in head like a viral respiratory infection.  He was sick for about 1.5 weeks and then began to improve.  He thought infection was resolved, but 2 days ago symptoms worsened. It moved into right face with significant pressure in right cheek, right eye, and right jaw with significant purulent, thin green/brown drainage.  There is no associated eyelid edema, eye redness or drainage, or vision changes.  There is no rhinorrhea or sore throat.  No sob, dyspnea, cough, or wheezing.  No nausea or vomiting. today he is improved somewhat, but still with some pressure.  No treatments tried      The encounter diagnosis was Acute non-recurrent maxillary sinusitis.    Allergies: Patient has no known allergies.  Current medicines (including changes today)  Current Outpatient Prescriptions   Medication Sig Dispense Refill   • amoxicillin-clavulanate (AUGMENTIN) 875-125 MG Tab Take 1 Tab by mouth 2 times a day. 14 Tab 0   • telmisartan (MICARDIS) 80 MG Tab TAKE 1 TABLET BY MOUTH EVERY DAY 90 Tab 3   • atorvastatin (LIPITOR) 20 MG Tab TAKE 1 TABLET BY MOUTH EVERY NIGHT AT BEDTIME 90 Tab 3   • metFORMIN (GLUCOPHAGE) 500 MG Tab Take 2 Tabs by mouth 2 times a day, with meals. 360 Tab 3   • vitamin D3, cholecalciferol, 1000 UNIT Tab Take 3 Tabs by mouth every day. 100 Tab 3     No current facility-administered medications for this visit.        He  has a past medical history of Hyperlipidemia and Hypertension.        ROS  As stated in HPI      Objective:     /72 (BP Location: Left arm, Patient Position: Sitting)   Pulse 67   Temp 36.6 °C (97.8 °F) (Temporal)   Ht 1.702 m (5' 7\")   Wt 73.5 kg (162 lb)   SpO2 94%  Body mass index is 25.37 kg/m².  Physical Exam:  General: Alert, oriented, in no " acute distress.  Eye contact is good, speech goal directed, affect calm  CNs grossly intact.  HEENT: conjunctiva non-injected, sclera non-icteric, EOMs intact. No lid edema or eye drainage.   Pinna without lesions. TM pearly gray bilaterally. Gross hearing intact.  Nasal turbinates without edema, and minimal clear drainage present. Turbinates slightly pale.   Oral mucous membranes pink and moist with no lesions. Oropharynx without erythema, or exudate.   Neck: Supple. No adenopathy or masses in the cervical or supraclavicular regions.   Lungs: unlabored. clear to auscultation bilaterally with good excursion.  CV: regular rate and rhythm.   Ext: no edema, normal color and temperature.   Skin: No rashes or lesions in visible areas  Gait steady.     Assessment and Plan:   Assessment/Plan:  1. Acute non-recurrent maxillary sinusitis  Not controlled. Likely bacterial. Start Augmentin now. Enc Neti-pot BID. No decongestants d/t blood pressure, but may use Mucinex expectorant   - amoxicillin-clavulanate (AUGMENTIN) 875-125 MG Tab; Take 1 Tab by mouth 2 times a day.  Dispense: 14 Tab; Refill: 0       Follow up:  Return if symptoms worsen or fail to improve.    Educated in proper administration of medication(s) ordered today including safety, possible SE, risks, benefits, rationale and alternatives to therapy.   Supportive care, differential diagnoses, and indications for immediate follow-up discussed with patient.    Pathogenesis of diagnosis discussed including typical length and natural progression.    Instructed to return to clinic or nearest emergency department for any change in condition, further concerns, or worsening of symptoms.  Patient states understanding of the plan of care and discharge instructions.      Please note that this dictation was created using voice recognition software. I have made every reasonable attempt to correct obvious errors, but I expect that there are errors of grammar and possibly content  that I did not discover before finalizing the note.    Followup: Return if symptoms worsen or fail to improve. sooner should new symptoms or problems arise.

## 2019-04-08 DIAGNOSIS — E78.2 MIXED HYPERLIPIDEMIA: ICD-10-CM

## 2019-04-08 DIAGNOSIS — R73.9 HYPERGLYCEMIA: ICD-10-CM

## 2019-04-08 DIAGNOSIS — I10 ESSENTIAL HYPERTENSION: ICD-10-CM

## 2019-04-27 ENCOUNTER — HOSPITAL ENCOUNTER (OUTPATIENT)
Dept: LAB | Facility: MEDICAL CENTER | Age: 59
End: 2019-04-27
Attending: FAMILY MEDICINE
Payer: COMMERCIAL

## 2019-04-27 DIAGNOSIS — E78.2 MIXED HYPERLIPIDEMIA: ICD-10-CM

## 2019-04-27 DIAGNOSIS — R73.9 HYPERGLYCEMIA: ICD-10-CM

## 2019-04-27 DIAGNOSIS — I10 ESSENTIAL HYPERTENSION: ICD-10-CM

## 2019-04-27 LAB
ALBUMIN SERPL BCP-MCNC: 4.6 G/DL (ref 3.2–4.9)
ALBUMIN/GLOB SERPL: 2.1 G/DL
ALP SERPL-CCNC: 85 U/L (ref 30–99)
ALT SERPL-CCNC: 25 U/L (ref 2–50)
ANION GAP SERPL CALC-SCNC: 11 MMOL/L (ref 0–11.9)
APPEARANCE UR: CLEAR
AST SERPL-CCNC: 21 U/L (ref 12–45)
BASOPHILS # BLD AUTO: 0.4 % (ref 0–1.8)
BASOPHILS # BLD: 0.02 K/UL (ref 0–0.12)
BILIRUB SERPL-MCNC: 0.9 MG/DL (ref 0.1–1.5)
BILIRUB UR QL STRIP.AUTO: NEGATIVE
BUN SERPL-MCNC: 18 MG/DL (ref 8–22)
CALCIUM SERPL-MCNC: 9.4 MG/DL (ref 8.5–10.5)
CHLORIDE SERPL-SCNC: 105 MMOL/L (ref 96–112)
CHOLEST SERPL-MCNC: 168 MG/DL (ref 100–199)
CO2 SERPL-SCNC: 25 MMOL/L (ref 20–33)
COLOR UR: YELLOW
CREAT SERPL-MCNC: 0.91 MG/DL (ref 0.5–1.4)
CREAT UR-MCNC: 144.9 MG/DL
EOSINOPHIL # BLD AUTO: 0.07 K/UL (ref 0–0.51)
EOSINOPHIL NFR BLD: 1.3 % (ref 0–6.9)
ERYTHROCYTE [DISTWIDTH] IN BLOOD BY AUTOMATED COUNT: 44.2 FL (ref 35.9–50)
EST. AVERAGE GLUCOSE BLD GHB EST-MCNC: 126 MG/DL
FASTING STATUS PATIENT QL REPORTED: NORMAL
GLOBULIN SER CALC-MCNC: 2.2 G/DL (ref 1.9–3.5)
GLUCOSE SERPL-MCNC: 113 MG/DL (ref 65–99)
GLUCOSE UR STRIP.AUTO-MCNC: NEGATIVE MG/DL
HBA1C MFR BLD: 6 % (ref 0–5.6)
HCT VFR BLD AUTO: 44 % (ref 42–52)
HDLC SERPL-MCNC: 72 MG/DL
HGB BLD-MCNC: 14.3 G/DL (ref 14–18)
IMM GRANULOCYTES # BLD AUTO: 0.02 K/UL (ref 0–0.11)
IMM GRANULOCYTES NFR BLD AUTO: 0.4 % (ref 0–0.9)
KETONES UR STRIP.AUTO-MCNC: ABNORMAL MG/DL
LDLC SERPL CALC-MCNC: 80 MG/DL
LEUKOCYTE ESTERASE UR QL STRIP.AUTO: NEGATIVE
LYMPHOCYTES # BLD AUTO: 1.35 K/UL (ref 1–4.8)
LYMPHOCYTES NFR BLD: 24.2 % (ref 22–41)
MCH RBC QN AUTO: 30.5 PG (ref 27–33)
MCHC RBC AUTO-ENTMCNC: 32.5 G/DL (ref 33.7–35.3)
MCV RBC AUTO: 93.8 FL (ref 81.4–97.8)
MICRO URNS: ABNORMAL
MICROALBUMIN UR-MCNC: 0.7 MG/DL
MICROALBUMIN/CREAT UR: 5 MG/G (ref 0–30)
MONOCYTES # BLD AUTO: 0.32 K/UL (ref 0–0.85)
MONOCYTES NFR BLD AUTO: 5.7 % (ref 0–13.4)
NEUTROPHILS # BLD AUTO: 3.8 K/UL (ref 1.82–7.42)
NEUTROPHILS NFR BLD: 68 % (ref 44–72)
NITRITE UR QL STRIP.AUTO: NEGATIVE
NRBC # BLD AUTO: 0 K/UL
NRBC BLD-RTO: 0 /100 WBC
PH UR STRIP.AUTO: 5.5 [PH]
PLATELET # BLD AUTO: 156 K/UL (ref 164–446)
PMV BLD AUTO: 11.9 FL (ref 9–12.9)
POTASSIUM SERPL-SCNC: 4.7 MMOL/L (ref 3.6–5.5)
PROT SERPL-MCNC: 6.8 G/DL (ref 6–8.2)
PROT UR QL STRIP: NEGATIVE MG/DL
RBC # BLD AUTO: 4.69 M/UL (ref 4.7–6.1)
RBC UR QL AUTO: NEGATIVE
SODIUM SERPL-SCNC: 141 MMOL/L (ref 135–145)
SP GR UR STRIP.AUTO: 1.02
TRIGL SERPL-MCNC: 79 MG/DL (ref 0–149)
UROBILINOGEN UR STRIP.AUTO-MCNC: 0.2 MG/DL
WBC # BLD AUTO: 5.6 K/UL (ref 4.8–10.8)

## 2019-04-27 PROCEDURE — 80061 LIPID PANEL: CPT

## 2019-04-27 PROCEDURE — 83036 HEMOGLOBIN GLYCOSYLATED A1C: CPT

## 2019-04-27 PROCEDURE — 85025 COMPLETE CBC W/AUTO DIFF WBC: CPT

## 2019-04-27 PROCEDURE — 82570 ASSAY OF URINE CREATININE: CPT

## 2019-04-27 PROCEDURE — 80053 COMPREHEN METABOLIC PANEL: CPT

## 2019-04-27 PROCEDURE — 36415 COLL VENOUS BLD VENIPUNCTURE: CPT

## 2019-04-27 PROCEDURE — 81003 URINALYSIS AUTO W/O SCOPE: CPT

## 2019-04-27 PROCEDURE — 82043 UR ALBUMIN QUANTITATIVE: CPT

## 2019-05-08 ENCOUNTER — APPOINTMENT (OUTPATIENT)
Dept: MEDICAL GROUP | Facility: PHYSICIAN GROUP | Age: 59
End: 2019-05-08
Payer: COMMERCIAL

## 2019-07-14 DIAGNOSIS — E78.2 MIXED HYPERLIPIDEMIA: ICD-10-CM

## 2019-07-15 RX ORDER — ATORVASTATIN CALCIUM 20 MG/1
TABLET, FILM COATED ORAL
Qty: 90 TAB | Refills: 3 | Status: SHIPPED | OUTPATIENT
Start: 2019-07-15 | End: 2020-01-12 | Stop reason: SDUPTHER

## 2019-07-15 NOTE — TELEPHONE ENCOUNTER
Was the patient seen in the last year in this department? Yes LAST SAW DR CASTRO 7/11/2018, SAW DELTA 4/5/2019    Does patient have an active prescription for medications requested? No     Received Request Via: Pharmacy

## 2019-07-16 ENCOUNTER — OFFICE VISIT (OUTPATIENT)
Dept: MEDICAL GROUP | Facility: PHYSICIAN GROUP | Age: 59
End: 2019-07-16
Payer: COMMERCIAL

## 2019-07-16 VITALS
HEART RATE: 60 BPM | BODY MASS INDEX: 25.9 KG/M2 | DIASTOLIC BLOOD PRESSURE: 68 MMHG | HEIGHT: 67 IN | RESPIRATION RATE: 16 BRPM | OXYGEN SATURATION: 98 % | WEIGHT: 165 LBS | SYSTOLIC BLOOD PRESSURE: 126 MMHG | TEMPERATURE: 98.1 F

## 2019-07-16 DIAGNOSIS — Z00.00 HEALTH CARE MAINTENANCE: Primary | ICD-10-CM

## 2019-07-16 DIAGNOSIS — R73.9 HYPERGLYCEMIA: ICD-10-CM

## 2019-07-16 DIAGNOSIS — E55.9 VITAMIN D DEFICIENCY DISEASE: ICD-10-CM

## 2019-07-16 DIAGNOSIS — Z12.5 PROSTATE CANCER SCREENING: ICD-10-CM

## 2019-07-16 DIAGNOSIS — Z20.5 EXPOSURE TO HEPATITIS C: ICD-10-CM

## 2019-07-16 DIAGNOSIS — E78.2 MIXED HYPERLIPIDEMIA: ICD-10-CM

## 2019-07-16 DIAGNOSIS — I10 ESSENTIAL HYPERTENSION: ICD-10-CM

## 2019-07-16 PROCEDURE — 99396 PREV VISIT EST AGE 40-64: CPT | Performed by: FAMILY MEDICINE

## 2019-07-16 RX ORDER — TELMISARTAN 80 MG/1
80 TABLET ORAL
Qty: 90 TAB | Refills: 3 | Status: SHIPPED | OUTPATIENT
Start: 2019-07-16 | End: 2020-01-12 | Stop reason: SDUPTHER

## 2019-07-16 ASSESSMENT — PATIENT HEALTH QUESTIONNAIRE - PHQ9: CLINICAL INTERPRETATION OF PHQ2 SCORE: 0

## 2019-07-17 NOTE — PATIENT INSTRUCTIONS
Patient given written instructions regarding labs,  medications, referrals, dietary and lifestyle management, and return visit.    Figueroa Lacey MD

## 2019-07-17 NOTE — PROGRESS NOTES
Patient comes in for annual exam.  He has been exercising and working out as well as watching his diet.  He has lost 34 pounds since June 2016.  He feels good.  He is due for lab work.  Because he lost weight he lowered his metformin dose to 500 mg 2 in the morning with breakfast.  He does not take it in the evenings.      Review of Systems   Constitutional: Negative.  Negative for fever, chills, weight loss and malaise/fatigue.   HENT: Negative for hearing loss, ear pain, congestion, sore throat, neck pain and tinnitus.    Eyes: Negative for blurred vision, double vision and pain.   Respiratory: Negative for cough, hemoptysis, shortness of breath and wheezing.    Cardiovascular: Negative for chest pain, palpitations, orthopnea, claudication, leg swelling and PND.   Gastrointestinal: Negative for heartburn, nausea, vomiting, abdominal pain, diarrhea, constipation, blood in stool and melena.   Genitourinary: Negative for incontinence, dysuria, urgency, frequency and hematuria.  Male--negative for erectile dysfunction  Musculoskeletal: Negative for myalgias, back pain and joint pain.   Skin: Negative for rash and itching. No lesions that will not heal.  Neurological: Negative for dizziness, tingling, tremors, focal weakness, seizures, loss of consciousness and headaches.   Endo/Heme/Allergies: Negative for environmental allergies and polydipsia.  Does not bruise/bleed easily.   Psychiatric/Behavioral: Negative for depression, memory loss and substance abuse.  The patient is not nervous/anxious and does not have insomnia.  All others negative.      I reviewed the following    Past Medical History:   Diagnosis Date   • Hyperlipidemia    • Hypertension         Past Surgical History:   Procedure Laterality Date   • ELBOWPLASTY         No Known Allergies    Current Outpatient Prescriptions   Medication Sig Dispense Refill   • telmisartan (MICARDIS) 80 MG Tab Take 1 Tab by mouth every day. 90 Tab 3   • metFORMIN  (GLUCOPHAGE) 500 MG Tab Take 2 Tabs by mouth every day. 180 Tab 3   • atorvastatin (LIPITOR) 20 MG Tab TAKE 1 TABLET BY MOUTH EVERY NIGHT AT BEDTIME 90 Tab 3   • vitamin D3, cholecalciferol, 1000 UNIT Tab Take 3 Tabs by mouth every day. 100 Tab 3     No current facility-administered medications for this visit.         Family History   Problem Relation Age of Onset   • Cancer Father         Prostate Cancer   • Lung Disease Father         COPD       Social History     Social History   • Marital status:      Spouse name: N/A   • Number of children: N/A   • Years of education: N/A     Occupational History   • Not on file.     Social History Main Topics   • Smoking status: Never Smoker   • Smokeless tobacco: Never Used   • Alcohol use 8.4 oz/week     14 Cans of beer per week   • Drug use: No   • Sexual activity: Yes     Partners: Female     Other Topics Concern   • Not on file     Social History Narrative   • No narrative on file      Physical Exam   Constitutional: He is oriented. He appears well-developed and well-nourished. No distress.   HENT:   Head: Normocephalic and atraumatic.   Right Ear: External ear normal. Ear canal and TM normal   Left Ear: External ear normal. Ear canal and TM normal   Nose: Nose normal.   Mouth/Throat: Oropharynx is clear and moist.   Eyes: Conjunctivae and extraocular motions are normal. Pupils are equal, round, and reactive to light.        Fundi benign bilaterally   Neck: No thyromegaly present.   Cardiovascular: Normal rate, regular rhythm, normal heart sounds and intact distal pulses.  Exam reveals no gallop.    No murmur heard.  Pulmonary/Chest: Effort normal and breath sounds normal. No respiratory distress. He has no wheezes. He has no rales.   Abdominal: Soft. Bowel sounds are normal. No hepatosplenomegaly. He exhibits no distension. No tenderness. He has no rebound and no guarding.   I offered to check the patient's prostate but he refused  Musculoskeletal: Normal range of  motion. He exhibits no edema and no tenderness.   Lymphadenopathy:     He has no cervical adenopathy.  No supraclavicular adenopathy.   Neurological: He is alert and oriented. He has normal reflexes.        Babinskis downgoing bilaterally   Skin: Skin is warm and dry. No rash noted. No erythema.   Psychiatric: He has a normal mood and appropriate affect. His behavior is normal. Judgment and thought content normal.     1. Health care maintenance --patient is doing well CBC WITH DIFFERENTIAL    Comp Metabolic Panel    Lipid Profile    URINALYSIS,CULTURE IF INDICATED   2. Mixed hyperlipidemia --needs reassessment Comp Metabolic Panel    Lipid Profile   3. Essential hypertension--controlled CBC WITH DIFFERENTIAL    Comp Metabolic Panel    Lipid Profile    URINALYSIS,CULTURE IF INDICATED    telmisartan (MICARDIS) 80 MG Tab   4. Hyperglycemia  metFORMIN (GLUCOPHAGE) 500 MG Tab   5. Vitamin D deficiency disease --needs reassessment VITAMIN D 25-HYDROXY   6. Exposure to hepatitis C  HEP C VIRUS ANTIBODY   7. Prostate cancer screening  PROSTATE SPECIFIC AG SCREENING     Recheck 1 year or as needed    Please note that this dictation was created using voice recognition software. I have worked with consultants from the vendor as well as technical experts from [facility] [employer] to optimize the interface. I have made every reasonable attempt to correct obvious errors, but I expect that there are errors of grammar and possibly content that I did not discover before finalizing the note.

## 2019-09-23 ENCOUNTER — HOSPITAL ENCOUNTER (OUTPATIENT)
Dept: LAB | Facility: MEDICAL CENTER | Age: 59
End: 2019-09-23
Attending: FAMILY MEDICINE
Payer: COMMERCIAL

## 2019-09-23 DIAGNOSIS — Z20.5 EXPOSURE TO HEPATITIS C: ICD-10-CM

## 2019-09-23 DIAGNOSIS — Z00.00 HEALTH CARE MAINTENANCE: ICD-10-CM

## 2019-09-23 DIAGNOSIS — I10 ESSENTIAL HYPERTENSION: ICD-10-CM

## 2019-09-23 DIAGNOSIS — E78.2 MIXED HYPERLIPIDEMIA: ICD-10-CM

## 2019-09-23 DIAGNOSIS — Z12.5 PROSTATE CANCER SCREENING: ICD-10-CM

## 2019-09-23 LAB
25(OH)D3 SERPL-MCNC: 33 NG/ML (ref 30–100)
ALBUMIN SERPL BCP-MCNC: 4.8 G/DL (ref 3.2–4.9)
ALBUMIN/GLOB SERPL: 2.2 G/DL
ALP SERPL-CCNC: 61 U/L (ref 30–99)
ALT SERPL-CCNC: 17 U/L (ref 2–50)
ANION GAP SERPL CALC-SCNC: 9 MMOL/L (ref 0–11.9)
APPEARANCE UR: ABNORMAL
AST SERPL-CCNC: 18 U/L (ref 12–45)
BACTERIA #/AREA URNS HPF: NEGATIVE /HPF
BASOPHILS # BLD AUTO: 0.6 % (ref 0–1.8)
BASOPHILS # BLD: 0.03 K/UL (ref 0–0.12)
BILIRUB SERPL-MCNC: 0.8 MG/DL (ref 0.1–1.5)
BILIRUB UR QL STRIP.AUTO: NEGATIVE
BUN SERPL-MCNC: 16 MG/DL (ref 8–22)
CALCIUM SERPL-MCNC: 9.7 MG/DL (ref 8.5–10.5)
CHLORIDE SERPL-SCNC: 103 MMOL/L (ref 96–112)
CHOLEST SERPL-MCNC: 191 MG/DL (ref 100–199)
CO2 SERPL-SCNC: 30 MMOL/L (ref 20–33)
COLOR UR: YELLOW
CREAT SERPL-MCNC: 1.07 MG/DL (ref 0.5–1.4)
EOSINOPHIL # BLD AUTO: 0.06 K/UL (ref 0–0.51)
EOSINOPHIL NFR BLD: 1.2 % (ref 0–6.9)
EPI CELLS #/AREA URNS HPF: NEGATIVE /HPF
ERYTHROCYTE [DISTWIDTH] IN BLOOD BY AUTOMATED COUNT: 46.3 FL (ref 35.9–50)
FASTING STATUS PATIENT QL REPORTED: NORMAL
GLOBULIN SER CALC-MCNC: 2.2 G/DL (ref 1.9–3.5)
GLUCOSE SERPL-MCNC: 96 MG/DL (ref 65–99)
GLUCOSE UR STRIP.AUTO-MCNC: NEGATIVE MG/DL
HCT VFR BLD AUTO: 46.4 % (ref 42–52)
HCV AB SER QL: NEGATIVE
HDLC SERPL-MCNC: 83 MG/DL
HGB BLD-MCNC: 15.4 G/DL (ref 14–18)
HYALINE CASTS #/AREA URNS LPF: ABNORMAL /LPF
IMM GRANULOCYTES # BLD AUTO: 0.03 K/UL (ref 0–0.11)
IMM GRANULOCYTES NFR BLD AUTO: 0.6 % (ref 0–0.9)
KETONES UR STRIP.AUTO-MCNC: NEGATIVE MG/DL
LDLC SERPL CALC-MCNC: 95 MG/DL
LEUKOCYTE ESTERASE UR QL STRIP.AUTO: NEGATIVE
LYMPHOCYTES # BLD AUTO: 1.32 K/UL (ref 1–4.8)
LYMPHOCYTES NFR BLD: 27.2 % (ref 22–41)
MCH RBC QN AUTO: 32.2 PG (ref 27–33)
MCHC RBC AUTO-ENTMCNC: 33.2 G/DL (ref 33.7–35.3)
MCV RBC AUTO: 97.1 FL (ref 81.4–97.8)
MICRO URNS: ABNORMAL
MONOCYTES # BLD AUTO: 0.4 K/UL (ref 0–0.85)
MONOCYTES NFR BLD AUTO: 8.2 % (ref 0–13.4)
NEUTROPHILS # BLD AUTO: 3.01 K/UL (ref 1.82–7.42)
NEUTROPHILS NFR BLD: 62.2 % (ref 44–72)
NITRITE UR QL STRIP.AUTO: NEGATIVE
NRBC # BLD AUTO: 0 K/UL
NRBC BLD-RTO: 0 /100 WBC
PH UR STRIP.AUTO: 5.5 [PH] (ref 5–8)
PLATELET # BLD AUTO: 160 K/UL (ref 164–446)
PMV BLD AUTO: 11.8 FL (ref 9–12.9)
POTASSIUM SERPL-SCNC: 4.4 MMOL/L (ref 3.6–5.5)
PROT SERPL-MCNC: 7 G/DL (ref 6–8.2)
PROT UR QL STRIP: NEGATIVE MG/DL
PSA SERPL-MCNC: 0.87 NG/ML (ref 0–4)
RBC # BLD AUTO: 4.78 M/UL (ref 4.7–6.1)
RBC # URNS HPF: ABNORMAL /HPF
RBC UR QL AUTO: NEGATIVE
SODIUM SERPL-SCNC: 142 MMOL/L (ref 135–145)
SP GR UR STRIP.AUTO: 1.02
TRIGL SERPL-MCNC: 65 MG/DL (ref 0–149)
UROBILINOGEN UR STRIP.AUTO-MCNC: 0.2 MG/DL
WBC # BLD AUTO: 4.9 K/UL (ref 4.8–10.8)
WBC #/AREA URNS HPF: ABNORMAL /HPF

## 2019-09-23 PROCEDURE — 80053 COMPREHEN METABOLIC PANEL: CPT

## 2019-09-23 PROCEDURE — 86803 HEPATITIS C AB TEST: CPT

## 2019-09-23 PROCEDURE — 84153 ASSAY OF PSA TOTAL: CPT

## 2019-09-23 PROCEDURE — 85025 COMPLETE CBC W/AUTO DIFF WBC: CPT

## 2019-09-23 PROCEDURE — 82306 VITAMIN D 25 HYDROXY: CPT

## 2019-09-23 PROCEDURE — 81001 URINALYSIS AUTO W/SCOPE: CPT

## 2019-09-23 PROCEDURE — 80061 LIPID PANEL: CPT

## 2019-09-23 PROCEDURE — 36415 COLL VENOUS BLD VENIPUNCTURE: CPT

## 2019-09-24 DIAGNOSIS — E55.9 VITAMIN D DEFICIENCY DISEASE: ICD-10-CM

## 2019-09-24 RX ORDER — MELATONIN
1000 DAILY
Qty: 100 TAB | Refills: 3
Start: 2019-09-24 | End: 2020-12-10

## 2020-01-12 DIAGNOSIS — R73.9 HYPERGLYCEMIA: ICD-10-CM

## 2020-01-12 DIAGNOSIS — I10 ESSENTIAL HYPERTENSION: ICD-10-CM

## 2020-01-12 DIAGNOSIS — E78.2 MIXED HYPERLIPIDEMIA: ICD-10-CM

## 2020-01-13 RX ORDER — ATORVASTATIN CALCIUM 20 MG/1
20 TABLET, FILM COATED ORAL DAILY
Qty: 90 TAB | Refills: 2 | Status: SHIPPED | OUTPATIENT
Start: 2020-01-13 | End: 2020-01-17 | Stop reason: SDUPTHER

## 2020-01-13 RX ORDER — TELMISARTAN 80 MG/1
80 TABLET ORAL
Qty: 90 TAB | Refills: 2 | Status: SHIPPED | OUTPATIENT
Start: 2020-01-13 | End: 2020-01-17 | Stop reason: SDUPTHER

## 2020-01-13 NOTE — TELEPHONE ENCOUNTER
Was the patient seen in the last year in this department? Yes lov 7/16/19    Does patient have an active prescription for medications requested? No     Received Request Via: Pharmacy

## 2020-01-15 DIAGNOSIS — E78.2 MIXED HYPERLIPIDEMIA: ICD-10-CM

## 2020-01-15 DIAGNOSIS — R73.9 HYPERGLYCEMIA: ICD-10-CM

## 2020-01-15 DIAGNOSIS — I10 ESSENTIAL HYPERTENSION: ICD-10-CM

## 2020-01-15 RX ORDER — TELMISARTAN 80 MG/1
80 TABLET ORAL
Qty: 90 TAB | Refills: 0 | OUTPATIENT
Start: 2020-01-15

## 2020-01-15 RX ORDER — ATORVASTATIN CALCIUM 20 MG/1
20 TABLET, FILM COATED ORAL DAILY
Qty: 90 TAB | Refills: 0 | OUTPATIENT
Start: 2020-01-15

## 2020-01-16 DIAGNOSIS — I10 ESSENTIAL HYPERTENSION: ICD-10-CM

## 2020-01-16 DIAGNOSIS — E78.2 MIXED HYPERLIPIDEMIA: ICD-10-CM

## 2020-01-16 DIAGNOSIS — R73.9 HYPERGLYCEMIA: ICD-10-CM

## 2020-01-16 NOTE — TELEPHONE ENCOUNTER
Pt Mail order service has changed. Chart has been updated. I see an approval for medication on 01/13/2020 but the stauts is print soit was never sent to the new Mail order Pharmacy.  Please send this request.  Thank you

## 2020-01-17 RX ORDER — TELMISARTAN 80 MG/1
80 TABLET ORAL
Qty: 90 TAB | Refills: 2 | Status: SHIPPED | OUTPATIENT
Start: 2020-01-17 | End: 2020-10-16 | Stop reason: SDUPTHER

## 2020-01-17 RX ORDER — ATORVASTATIN CALCIUM 20 MG/1
20 TABLET, FILM COATED ORAL DAILY
Qty: 90 TAB | Refills: 2 | Status: SHIPPED | OUTPATIENT
Start: 2020-01-17 | End: 2020-10-16 | Stop reason: SDUPTHER

## 2020-07-24 ENCOUNTER — OFFICE VISIT (OUTPATIENT)
Dept: MEDICAL GROUP | Facility: PHYSICIAN GROUP | Age: 60
End: 2020-07-24
Payer: COMMERCIAL

## 2020-07-24 VITALS
RESPIRATION RATE: 14 BRPM | WEIGHT: 185 LBS | BODY MASS INDEX: 29.03 KG/M2 | TEMPERATURE: 99.2 F | OXYGEN SATURATION: 96 % | HEART RATE: 70 BPM | SYSTOLIC BLOOD PRESSURE: 132 MMHG | HEIGHT: 67 IN | DIASTOLIC BLOOD PRESSURE: 82 MMHG

## 2020-07-24 DIAGNOSIS — I10 ESSENTIAL HYPERTENSION: Chronic | ICD-10-CM

## 2020-07-24 DIAGNOSIS — Z23 NEED FOR VACCINATION: ICD-10-CM

## 2020-07-24 DIAGNOSIS — E11.9 TYPE 2 DIABETES MELLITUS WITHOUT COMPLICATION, WITHOUT LONG-TERM CURRENT USE OF INSULIN (HCC): ICD-10-CM

## 2020-07-24 DIAGNOSIS — E55.9 VITAMIN D DEFICIENCY: Chronic | ICD-10-CM

## 2020-07-24 DIAGNOSIS — E78.2 MIXED HYPERLIPIDEMIA: Chronic | ICD-10-CM

## 2020-07-24 PROCEDURE — 99204 OFFICE O/P NEW MOD 45 MIN: CPT | Performed by: INTERNAL MEDICINE

## 2020-07-24 ASSESSMENT — PATIENT HEALTH QUESTIONNAIRE - PHQ9: CLINICAL INTERPRETATION OF PHQ2 SCORE: 0

## 2020-07-24 ASSESSMENT — FIBROSIS 4 INDEX: FIB4 SCORE: 1.58

## 2020-07-24 NOTE — PROGRESS NOTES
CC: Establish care  Follow-up hypertension  Patient request a prescription for Shingrix vaccination at the local pharmacy.      HPI: 60 y.o. Patient presents to discuss the following:     Hypertension  This is a chronic condition the patient presently taking Micardis.  No side effect reported blood pressure has been well controlled at home.    Mixed hyperlipidemia  Chronic condition patient is now taking atorvastatin no side effect reported patient is due for lab work.    Diabetes mellitus (HCC)  This is a chronic condition the patient is taking Metformin.  Denies hypo-or hypoglycemic symptoms he is due for lab work.    Vitamin D deficiency disease  Chronic condition patient takes vitamin D supplementation is due for lab work.          REVIEW OF SYSTEMS:     Constitutional:  no fever / chills   Neurologic: no headaches, no numbness/tingling  Eyes: no changes in vision  ENT: no sore throat, no hearing loss  CV:  no chest pain, no palpitations  Pulmonary: no SOB, no cough    GI: no nausea / vomiting, no diarrhea, no constipation  :  no dysuria, no hematuria   Skin: no rash  Hematologic: no bleeding      Allergies: Patient has no known allergies.    Current Outpatient Medications Ordered in Epic   Medication Sig Dispense Refill   • Zoster Vac Recomb Adjuvanted (SHINGRIX) 50 MCG/0.5ML Recon Susp 0.5 mL by Intramuscular route Once for 1 dose. 0.5 mL 0   • atorvastatin (LIPITOR) 20 MG Tab Take 1 Tab by mouth every day. 90 Tab 2   • metFORMIN (GLUCOPHAGE) 500 MG Tab Take 2 Tabs by mouth every day. 180 Tab 2   • telmisartan (MICARDIS) 80 MG Tab Take 1 Tab by mouth every day. 90 Tab 2   • vitamin D3, cholecalciferol, 1000 UNIT Tab Take 1 Tab by mouth every day. 100 Tab 3     No current Epic-ordered facility-administered medications on file.        Past Medical History:   Diagnosis Date   • Hyperlipidemia    • Hypertension         Past Surgical History:   Procedure Laterality Date   • ELBOWPLASTY          Family History    Problem Relation Age of Onset   • Cancer Father         Prostate Cancer   • Lung Disease Father         COPD        Social History     Tobacco Use   Smoking Status Never Smoker   Smokeless Tobacco Never Used          Social History     Substance and Sexual Activity   Alcohol Use Yes   • Alcohol/week: 8.4 oz   • Types: 14 Cans of beer per week        ---------------------------------------------------------------------     PHYSICAL EXAM:   Vitals:    07/24/20 1503   BP: 132/82   Pulse: 70   Resp: 14   Temp: 37.3 °C (99.2 °F)   SpO2: 96%      Body mass index is 28.98 kg/m².        Constitutional: no acute distress  Eyes: PERRL, EOMI  Ears/nose/mouth: OP no exudates  Neck: supple, no JVD  CV: heart RRR  Resp: normal effort, no wheezing or rales.  GI: abdomen soft, no obvious mass, no tenderness  Neuro: CN 2-12 grossly intact  Skin: no obvious rash noted  VIDAL recommend to check for prostate exam patient refused.  Patient requests PSA to be done      ---------------------------------------------------------------------     ASSESSMENT and PLAN:  1. Type 2 diabetes mellitus without complication, without long-term current use of insulin (HCC)  Chronic condition control is unclear if lab tests ordered.  Advised the patient to continue with metformin at this time.  A1c goal of 7% discussed w pt.  Advised lifestyle modification.  Stressed the importance of low sweet /low carb diet, high lean protein, and avoid unhealthy foods.  Encourage pt to start/continue with regular exercises/walking.   - HEMOGLOBIN A1C; Future  - ALANINE AMINO-TRANS; Future  - Basic Metabolic Panel; Future  - CBC WITH DIFFERENTIAL; Future  - Lipid Profile; Future  - PROSTATE SPECIFIC AG SCREENING; Future  - TSH; Future  - MICROALBUMIN CREAT RATIO URINE; Future  - VITAMIN 1,25 DIHYDROXY; Future    2. Essential hypertension  Chronic stable condition continue with Micardis    3. Mixed hyperlipidemia  Chronic condition control is unclear lab tests ordered.   Recommend low-fat low-cholesterol diet.    4. Vitamin D deficiency disease  Chronic condition lab tests ordered for follow-up.    5. Need for vaccination    - Zoster Vac Recomb Adjuvanted (SHINGRIX) 50 MCG/0.5ML Recon Susp; 0.5 mL by Intramuscular route Once for 1 dose.  Dispense: 0.5 mL; Refill: 0            Return in about 6 months (around 1/24/2021).       PATIENT EDUCATION:  -If any problems should arise, patient was advised to contact our office or go to ER to be evaluated.  -Advised pt to follow a healthy diet and regular aerobic exercise regimen. Advised pt to avoid alcohol and tobacco use.    Please note that this dictation was created using voice recognition software. I have made every reasonable attempt to correct obvious errors, but it is possible there are errors of grammar and possibly content that I did not discover before finalizing the note.

## 2020-07-24 NOTE — ASSESSMENT & PLAN NOTE
Chronic condition patient is now taking atorvastatin no side effect reported patient is due for lab work.

## 2020-07-24 NOTE — ASSESSMENT & PLAN NOTE
This is a chronic condition the patient presently taking Micardis.  No side effect reported blood pressure has been well controlled at home.

## 2020-07-24 NOTE — ASSESSMENT & PLAN NOTE
This is a chronic condition the patient is taking Metformin.  Denies hypo-or hypoglycemic symptoms he is due for lab work.

## 2020-10-16 DIAGNOSIS — R73.9 HYPERGLYCEMIA: ICD-10-CM

## 2020-10-16 DIAGNOSIS — E78.2 MIXED HYPERLIPIDEMIA: ICD-10-CM

## 2020-10-16 DIAGNOSIS — I10 ESSENTIAL HYPERTENSION: ICD-10-CM

## 2020-10-16 RX ORDER — ATORVASTATIN CALCIUM 20 MG/1
20 TABLET, FILM COATED ORAL DAILY
Qty: 30 TAB | Refills: 0 | Status: SHIPPED | OUTPATIENT
Start: 2020-10-16 | End: 2020-12-09 | Stop reason: SDUPTHER

## 2020-10-16 RX ORDER — TELMISARTAN 80 MG/1
80 TABLET ORAL
Qty: 30 TAB | Refills: 0 | Status: SHIPPED | OUTPATIENT
Start: 2020-10-16 | End: 2020-10-19 | Stop reason: SDUPTHER

## 2020-10-17 NOTE — TELEPHONE ENCOUNTER
Request in right fax for telmisartan, atorvastatin and metformin. Patient switched primary care to Dr. Herman recently. Will forward.

## 2020-10-17 NOTE — TELEPHONE ENCOUNTER
Pt is due for labs. Please advise pt go to lab asap next 30d. Thanks.  No further refills until labs done

## 2020-10-19 ENCOUNTER — PATIENT MESSAGE (OUTPATIENT)
Dept: MEDICAL GROUP | Facility: PHYSICIAN GROUP | Age: 60
End: 2020-10-19

## 2020-10-19 RX ORDER — TELMISARTAN 80 MG/1
80 TABLET ORAL
Qty: 30 TAB | Refills: 0 | Status: SHIPPED | OUTPATIENT
Start: 2020-10-19 | End: 2020-10-27 | Stop reason: SDUPTHER

## 2020-10-19 NOTE — TELEPHONE ENCOUNTER
From: Milton Beal  To: Donnie Herman M.D.  Sent: 10/19/2020 7:23 AM PDT  Subject: Prescription Question    Hi Dr Herman. Need refills on Telmisartan, Metformin. Their still under Dr Lacey so I cant request Refills would you please write for [facility] mail order pharmacy? Telmisartan 80 mg qd RX [phone]. Metformin 500 mg bid. RX [phone]. Thanks. Alia [phone]

## 2020-10-19 NOTE — TELEPHONE ENCOUNTER
Pt wrote a my chart message stating that he needs these two prescriptions for Telmisartan and Metformin to go to [facility] instead of [facility].    Please reroute to correct pharmacy.    Thank you

## 2020-10-27 DIAGNOSIS — I10 ESSENTIAL HYPERTENSION: ICD-10-CM

## 2020-10-27 DIAGNOSIS — R73.9 HYPERGLYCEMIA: ICD-10-CM

## 2020-10-27 RX ORDER — TELMISARTAN 80 MG/1
80 TABLET ORAL
Qty: 30 TAB | Refills: 0 | Status: SHIPPED | OUTPATIENT
Start: 2020-10-27 | End: 2020-10-29 | Stop reason: SDUPTHER

## 2020-10-27 NOTE — TELEPHONE ENCOUNTER
Received request via: Pharmacy    Was the patient seen in the last year in this department? Yes    Does the patient have an active prescription (recently filled or refills available) for medication(s) requested? Yes. Pharmacy change

## 2020-10-29 ENCOUNTER — TELEPHONE (OUTPATIENT)
Dept: MEDICAL GROUP | Facility: PHYSICIAN GROUP | Age: 60
End: 2020-10-29

## 2020-10-29 DIAGNOSIS — I10 ESSENTIAL HYPERTENSION: ICD-10-CM

## 2020-10-29 DIAGNOSIS — R73.9 HYPERGLYCEMIA: ICD-10-CM

## 2020-10-29 RX ORDER — TELMISARTAN 80 MG/1
80 TABLET ORAL
Qty: 30 TAB | Refills: 0 | Status: SHIPPED | OUTPATIENT
Start: 2020-10-29 | End: 2020-12-09 | Stop reason: SDUPTHER

## 2020-10-29 NOTE — TELEPHONE ENCOUNTER
VOICEMAIL  1. Caller Name: Milton Hatch                        Call Back Number: [phone] (home)       2. Message: Pt wife called stating that pt needs to have medications go to [facility] [address] [facility] of [facility], Pt wife states that the pt insurance would not cover meds at [facility].    3. Patient approves office to leave a detailed voicemail/MyChart message: N\A

## 2020-10-29 NOTE — TELEPHONE ENCOUNTER
Received request via: Patient wife    Was the patient seen in the last year in this department? Yes    Does the patient have an active prescription (recently filled or refills available) for medication(s) requested? Yes, pt wife requests medications to be sent to [facility] on [address] due to Insurance coverage issues

## 2020-11-18 ENCOUNTER — HOSPITAL ENCOUNTER (OUTPATIENT)
Dept: LAB | Facility: MEDICAL CENTER | Age: 60
End: 2020-11-18
Attending: INTERNAL MEDICINE
Payer: COMMERCIAL

## 2020-11-18 DIAGNOSIS — E11.9 TYPE 2 DIABETES MELLITUS WITHOUT COMPLICATION, WITHOUT LONG-TERM CURRENT USE OF INSULIN (HCC): ICD-10-CM

## 2020-11-18 LAB
ALT SERPL-CCNC: 44 U/L (ref 2–50)
ANION GAP SERPL CALC-SCNC: 12 MMOL/L (ref 7–16)
BASOPHILS # BLD AUTO: 0.6 % (ref 0–1.8)
BASOPHILS # BLD: 0.03 K/UL (ref 0–0.12)
BUN SERPL-MCNC: 11 MG/DL (ref 8–22)
CALCIUM SERPL-MCNC: 10.1 MG/DL (ref 8.5–10.5)
CHLORIDE SERPL-SCNC: 100 MMOL/L (ref 96–112)
CHOLEST SERPL-MCNC: 162 MG/DL (ref 100–199)
CO2 SERPL-SCNC: 27 MMOL/L (ref 20–33)
CREAT SERPL-MCNC: 0.88 MG/DL (ref 0.5–1.4)
CREAT UR-MCNC: 196.36 MG/DL
EOSINOPHIL # BLD AUTO: 0.07 K/UL (ref 0–0.51)
EOSINOPHIL NFR BLD: 1.5 % (ref 0–6.9)
ERYTHROCYTE [DISTWIDTH] IN BLOOD BY AUTOMATED COUNT: 41.7 FL (ref 35.9–50)
EST. AVERAGE GLUCOSE BLD GHB EST-MCNC: 128 MG/DL
FASTING STATUS PATIENT QL REPORTED: NORMAL
GLUCOSE SERPL-MCNC: 122 MG/DL (ref 65–99)
HBA1C MFR BLD: 6.1 % (ref 0–5.6)
HCT VFR BLD AUTO: 46 % (ref 42–52)
HDLC SERPL-MCNC: 71 MG/DL
HGB BLD-MCNC: 15.7 G/DL (ref 14–18)
IMM GRANULOCYTES # BLD AUTO: 0.02 K/UL (ref 0–0.11)
IMM GRANULOCYTES NFR BLD AUTO: 0.4 % (ref 0–0.9)
LDLC SERPL CALC-MCNC: 73 MG/DL
LYMPHOCYTES # BLD AUTO: 1.46 K/UL (ref 1–4.8)
LYMPHOCYTES NFR BLD: 31.1 % (ref 22–41)
MCH RBC QN AUTO: 32.8 PG (ref 27–33)
MCHC RBC AUTO-ENTMCNC: 34.1 G/DL (ref 33.7–35.3)
MCV RBC AUTO: 96 FL (ref 81.4–97.8)
MICROALBUMIN UR-MCNC: 2.1 MG/DL
MICROALBUMIN/CREAT UR: 11 MG/G (ref 0–30)
MONOCYTES # BLD AUTO: 0.38 K/UL (ref 0–0.85)
MONOCYTES NFR BLD AUTO: 8.1 % (ref 0–13.4)
NEUTROPHILS # BLD AUTO: 2.73 K/UL (ref 1.82–7.42)
NEUTROPHILS NFR BLD: 58.3 % (ref 44–72)
NRBC # BLD AUTO: 0 K/UL
NRBC BLD-RTO: 0 /100 WBC
PLATELET # BLD AUTO: 167 K/UL (ref 164–446)
PMV BLD AUTO: 10.9 FL (ref 9–12.9)
POTASSIUM SERPL-SCNC: 4.6 MMOL/L (ref 3.6–5.5)
PSA SERPL-MCNC: 0.93 NG/ML (ref 0–4)
RBC # BLD AUTO: 4.79 M/UL (ref 4.7–6.1)
SODIUM SERPL-SCNC: 139 MMOL/L (ref 135–145)
TRIGL SERPL-MCNC: 92 MG/DL (ref 0–149)
TSH SERPL DL<=0.005 MIU/L-ACNC: 1.54 UIU/ML (ref 0.38–5.33)
WBC # BLD AUTO: 4.7 K/UL (ref 4.8–10.8)

## 2020-11-18 PROCEDURE — 82570 ASSAY OF URINE CREATININE: CPT

## 2020-11-18 PROCEDURE — 80048 BASIC METABOLIC PNL TOTAL CA: CPT

## 2020-11-18 PROCEDURE — 80061 LIPID PANEL: CPT

## 2020-11-18 PROCEDURE — 84153 ASSAY OF PSA TOTAL: CPT

## 2020-11-18 PROCEDURE — 84443 ASSAY THYROID STIM HORMONE: CPT

## 2020-11-18 PROCEDURE — 82043 UR ALBUMIN QUANTITATIVE: CPT

## 2020-11-18 PROCEDURE — 82652 VIT D 1 25-DIHYDROXY: CPT

## 2020-11-18 PROCEDURE — 84460 ALANINE AMINO (ALT) (SGPT): CPT

## 2020-11-18 PROCEDURE — 85025 COMPLETE CBC W/AUTO DIFF WBC: CPT

## 2020-11-18 PROCEDURE — 36415 COLL VENOUS BLD VENIPUNCTURE: CPT

## 2020-11-18 PROCEDURE — 83036 HEMOGLOBIN GLYCOSYLATED A1C: CPT

## 2020-11-19 LAB — 1,25(OH)2D3 SERPL-MCNC: 54.3 PG/ML (ref 19.9–79.3)

## 2020-12-09 DIAGNOSIS — E78.2 MIXED HYPERLIPIDEMIA: ICD-10-CM

## 2020-12-09 DIAGNOSIS — R73.9 HYPERGLYCEMIA: ICD-10-CM

## 2020-12-09 DIAGNOSIS — I10 ESSENTIAL HYPERTENSION: ICD-10-CM

## 2020-12-10 DIAGNOSIS — R73.9 HYPERGLYCEMIA: ICD-10-CM

## 2020-12-10 DIAGNOSIS — I10 ESSENTIAL HYPERTENSION: ICD-10-CM

## 2020-12-10 DIAGNOSIS — E55.9 VITAMIN D DEFICIENCY DISEASE: ICD-10-CM

## 2020-12-10 DIAGNOSIS — E78.2 MIXED HYPERLIPIDEMIA: ICD-10-CM

## 2020-12-10 RX ORDER — TELMISARTAN 80 MG/1
80 TABLET ORAL
Qty: 30 TAB | Refills: 6 | Status: SHIPPED
Start: 2020-12-10 | End: 2020-12-10

## 2020-12-10 RX ORDER — MELATONIN
1000 DAILY
Qty: 90 TAB | Refills: 3 | Status: SHIPPED | OUTPATIENT
Start: 2020-12-10

## 2020-12-10 RX ORDER — ATORVASTATIN CALCIUM 20 MG/1
20 TABLET, FILM COATED ORAL DAILY
Qty: 90 TAB | Refills: 3 | Status: SHIPPED | OUTPATIENT
Start: 2020-12-10 | End: 2021-11-10 | Stop reason: SDUPTHER

## 2020-12-10 RX ORDER — ATORVASTATIN CALCIUM 20 MG/1
20 TABLET, FILM COATED ORAL DAILY
Qty: 30 TAB | Refills: 6 | Status: SHIPPED
Start: 2020-12-10 | End: 2020-12-10

## 2020-12-10 RX ORDER — TELMISARTAN 80 MG/1
80 TABLET ORAL
Qty: 90 TAB | Refills: 3 | Status: SHIPPED | OUTPATIENT
Start: 2020-12-10 | End: 2021-11-10 | Stop reason: SDUPTHER

## 2020-12-10 NOTE — TELEPHONE ENCOUNTER
Dr Herman, can you please re-send pt's scripts to his mail in pharmacy which is   [facility], [place], [place].    Thanks.

## 2021-03-15 DIAGNOSIS — Z23 NEED FOR VACCINATION: ICD-10-CM

## 2021-03-17 ENCOUNTER — IMMUNIZATION (OUTPATIENT)
Dept: FAMILY PLANNING/WOMEN'S HEALTH CLINIC | Facility: IMMUNIZATION CENTER | Age: 61
End: 2021-03-17
Attending: INTERNAL MEDICINE
Payer: COMMERCIAL

## 2021-03-17 DIAGNOSIS — Z23 ENCOUNTER FOR VACCINATION: Primary | ICD-10-CM

## 2021-03-17 DIAGNOSIS — Z23 NEED FOR VACCINATION: ICD-10-CM

## 2021-03-17 PROCEDURE — 0001A PFIZER SARS-COV-2 VACCINE: CPT | Performed by: INTERNAL MEDICINE

## 2021-03-17 PROCEDURE — 91300 PFIZER SARS-COV-2 VACCINE: CPT | Performed by: INTERNAL MEDICINE

## 2021-04-08 ENCOUNTER — IMMUNIZATION (OUTPATIENT)
Dept: FAMILY PLANNING/WOMEN'S HEALTH CLINIC | Facility: IMMUNIZATION CENTER | Age: 61
End: 2021-04-08
Attending: INTERNAL MEDICINE
Payer: COMMERCIAL

## 2021-04-08 DIAGNOSIS — Z23 ENCOUNTER FOR VACCINATION: Primary | ICD-10-CM

## 2021-04-08 PROCEDURE — 91300 PFIZER SARS-COV-2 VACCINE: CPT | Performed by: INTERNAL MEDICINE

## 2021-04-08 PROCEDURE — 0002A PFIZER SARS-COV-2 VACCINE: CPT | Performed by: INTERNAL MEDICINE

## 2021-11-09 ENCOUNTER — APPOINTMENT (OUTPATIENT)
Dept: MEDICAL GROUP | Facility: PHYSICIAN GROUP | Age: 61
End: 2021-11-09
Payer: COMMERCIAL

## 2021-11-10 ENCOUNTER — OFFICE VISIT (OUTPATIENT)
Dept: MEDICAL GROUP | Facility: PHYSICIAN GROUP | Age: 61
End: 2021-11-10
Payer: COMMERCIAL

## 2021-11-10 VITALS
DIASTOLIC BLOOD PRESSURE: 74 MMHG | HEART RATE: 88 BPM | RESPIRATION RATE: 16 BRPM | OXYGEN SATURATION: 97 % | BODY MASS INDEX: 30.13 KG/M2 | WEIGHT: 192 LBS | TEMPERATURE: 99.5 F | HEIGHT: 67 IN | SYSTOLIC BLOOD PRESSURE: 132 MMHG

## 2021-11-10 DIAGNOSIS — I10 PRIMARY HYPERTENSION: Chronic | ICD-10-CM

## 2021-11-10 DIAGNOSIS — E78.2 MIXED HYPERLIPIDEMIA: Chronic | ICD-10-CM

## 2021-11-10 DIAGNOSIS — Z12.5 SCREENING FOR MALIGNANT NEOPLASM OF PROSTATE: ICD-10-CM

## 2021-11-10 DIAGNOSIS — E55.9 VITAMIN D DEFICIENCY: Chronic | ICD-10-CM

## 2021-11-10 DIAGNOSIS — E11.9 TYPE 2 DIABETES MELLITUS WITHOUT COMPLICATION, WITHOUT LONG-TERM CURRENT USE OF INSULIN (HCC): Chronic | ICD-10-CM

## 2021-11-10 DIAGNOSIS — I10 ESSENTIAL HYPERTENSION: ICD-10-CM

## 2021-11-10 PROCEDURE — 99396 PREV VISIT EST AGE 40-64: CPT | Performed by: INTERNAL MEDICINE

## 2021-11-10 RX ORDER — ATORVASTATIN CALCIUM 20 MG/1
20 TABLET, FILM COATED ORAL DAILY
Qty: 90 TABLET | Refills: 3 | Status: SHIPPED | OUTPATIENT
Start: 2021-11-10 | End: 2021-12-26 | Stop reason: SDUPTHER

## 2021-11-10 RX ORDER — TELMISARTAN 80 MG/1
80 TABLET ORAL
Qty: 90 TABLET | Refills: 3 | Status: SHIPPED | OUTPATIENT
Start: 2021-11-10 | End: 2021-12-26 | Stop reason: SDUPTHER

## 2021-11-10 ASSESSMENT — PATIENT HEALTH QUESTIONNAIRE - PHQ9: CLINICAL INTERPRETATION OF PHQ2 SCORE: 0

## 2021-11-10 NOTE — PROGRESS NOTES
"PRIMARY CARE CLINIC VISIT  Chief Complaint   Patient presents with   • Annual Exam     Follow-up diabetes  Prescription refills    History of Present Illness     Diabetes mellitus (HCC)  Chronic condition.  Patient being treated with Metformin.  Patient denies significant hyper or hypoglycemic symptoms.  Lab tests ordered for follow-up.    Vitamin D deficiency disease  Patient is currently taking vitamin D supplementation.  Lab tests requested for follow-up.    Mixed hyperlipidemia  Chronic condition.  The patient being treated with atorvastatin.  Lipid panel ordered for follow-up.    Hypertension  This is a chronic condition.  Patient is currently taking Micardis daily.  No significant side effects reported.      Current Outpatient Medications on File Prior to Visit   Medication Sig Dispense Refill   • vitamin D (CHOLECALCIFEROL) 1000 Unit Tab Take 1 Tab by mouth every day. 90 Tab 3     No current facility-administered medications on file prior to visit.        Allergies: Patient has no known allergies.    ROS  As per HPI above. All other systems reviewed and negative.      Past Medical, Social, and Family history reviewed and updated in EPIC     Objective     /74   Pulse 88   Temp 37.5 °C (99.5 °F) (Temporal)   Resp 16   Ht 1.702 m (5' 7\")   Wt 87.1 kg (192 lb)   SpO2 97%    Body mass index is 30.07 kg/m².    General: alert and oriented  Cardiovascular: regular rate and rhythm  Pulmonary: lungs : no wheezing or rales  Gastrointestinal: no tenderness to palpation. No obvious mass noted  Monofilament testing with a 10 gram force: sensation intact: intact bilaterally  Visual Inspection: Feet without maceration, ulcers, fissures.  Pedal pulses: intact bilaterally        Assessment and Plan     1. Type 2 diabetes mellitus without complication, without long-term current use of insulin (HCC)  Current control is unclear. Lab tests ordered for follow-up.  A1c goal of 7% discussed.  Pt's education:   -Advised " the  benefits of blood glucose monitoring, potential hypoglycemia , medication mode of action/ possible side effects, the effects of exercise, potential acute and chronic conditions related to diabetes.   -Discussed with pt regarding changing diet and making better choices to help  blood sugar.  -Also encouraged pt to continue with regular exercises/walking.        - HEMOGLOBIN A1C; Future  - Basic Metabolic Panel; Future  - MICROALBUMIN CREAT RATIO URINE; Future  - metFORMIN (GLUCOPHAGE) 500 MG Tab; Take 2 Tablets by mouth every day.  Dispense: 180 Tablet; Refill: 3  - Referral to Ophthalmology    2. Vitamin D deficiency disease    - VITAMIN D,25 HYDROXY; Future    3. Mixed hyperlipidemia  Lipid panel ordered. Recommend low-fat low-cholesterol diet.  - ALANINE AMINO-TRANS; Future  - Lipid Profile; Future  - atorvastatin (LIPITOR) 20 MG Tab; Take 1 Tablet by mouth every day.  Dispense: 90 Tablet; Refill: 3    4. Primary hypertension  Chronic stable condition. Continue with Micardis. Blood pressure today 132/74.  - CBC WITHOUT DIFFERENTIAL; Future  - TSH; Future  - URINALYSIS; Future    5. Screening for malignant neoplasm of prostate    - PROSTATE SPECIFIC AG SCREENING; Future        Advised the patient to call back for the lab test result after few days.     Follow up: 6 months      Healthcare maintenance     Health Maintenance Due   Topic Date Due   • IMM PNEUMOCOCCAL VACCINE: 0-64 Years (1 of 2 - PPSV23) Never done   • RETINAL SCREENING  Never done   • IMM HEP B VACCINE (1 of 3 - Risk 3-dose series) Never done   • IMM ZOSTER VACCINES (1 of 2) Never done   • DIABETES MONOFILAMENT / LE EXAM  06/27/2018   • A1C SCREENING  05/18/2021   • COVID-19 Vaccine (3 - Booster for Pfizer series) 10/08/2021   • FASTING LIPID PROFILE  11/18/2021   • URINE ACR / MICROALBUMIN  11/18/2021   • SERUM CREATININE  11/18/2021         -If any problems should arise, patient was advised to contact our office for followup or go to ER to be  evaluated.    Please note that this dictation was created using voice recognition software. I have made every reasonable attempt to correct obvious errors, but it is possible there are errors of grammar and possibly content that I did not discover before finalizing the note.      Donnie Herman MD  Internal Medicine  [facility]

## 2021-11-10 NOTE — ASSESSMENT & PLAN NOTE
This is a chronic condition.  Patient is currently taking Micardis daily.  No significant side effects reported.

## 2021-11-10 NOTE — ASSESSMENT & PLAN NOTE
Chronic condition.  Patient being treated with Metformin.  Patient denies significant hyper or hypoglycemic symptoms.  Lab tests ordered for follow-up.

## 2021-11-10 NOTE — ASSESSMENT & PLAN NOTE
Chronic condition.  The patient being treated with atorvastatin.  Lipid panel ordered for follow-up.

## 2021-12-06 ENCOUNTER — HOSPITAL ENCOUNTER (OUTPATIENT)
Dept: LAB | Facility: MEDICAL CENTER | Age: 61
End: 2021-12-06
Attending: INTERNAL MEDICINE
Payer: COMMERCIAL

## 2021-12-06 DIAGNOSIS — R74.8 LIVER ENZYME ELEVATION: ICD-10-CM

## 2021-12-06 DIAGNOSIS — E78.2 MIXED HYPERLIPIDEMIA: Chronic | ICD-10-CM

## 2021-12-06 DIAGNOSIS — E55.9 VITAMIN D DEFICIENCY: Chronic | ICD-10-CM

## 2021-12-06 DIAGNOSIS — Z12.5 SCREENING FOR MALIGNANT NEOPLASM OF PROSTATE: ICD-10-CM

## 2021-12-06 DIAGNOSIS — E11.9 TYPE 2 DIABETES MELLITUS WITHOUT COMPLICATION, WITHOUT LONG-TERM CURRENT USE OF INSULIN (HCC): Chronic | ICD-10-CM

## 2021-12-06 DIAGNOSIS — I10 PRIMARY HYPERTENSION: Chronic | ICD-10-CM

## 2021-12-06 LAB
ALT SERPL-CCNC: 54 U/L (ref 2–50)
ANION GAP SERPL CALC-SCNC: 14 MMOL/L (ref 7–16)
APPEARANCE UR: CLEAR
BILIRUB UR QL STRIP.AUTO: NEGATIVE
BUN SERPL-MCNC: 11 MG/DL (ref 8–22)
CALCIUM SERPL-MCNC: 9.5 MG/DL (ref 8.5–10.5)
CHLORIDE SERPL-SCNC: 102 MMOL/L (ref 96–112)
CHOLEST SERPL-MCNC: 167 MG/DL (ref 100–199)
CO2 SERPL-SCNC: 24 MMOL/L (ref 20–33)
COLOR UR: YELLOW
CREAT SERPL-MCNC: 0.9 MG/DL (ref 0.5–1.4)
CREAT UR-MCNC: 68.23 MG/DL
ERYTHROCYTE [DISTWIDTH] IN BLOOD BY AUTOMATED COUNT: 40.3 FL (ref 35.9–50)
EST. AVERAGE GLUCOSE BLD GHB EST-MCNC: 137 MG/DL
GLUCOSE SERPL-MCNC: 143 MG/DL (ref 65–99)
GLUCOSE UR STRIP.AUTO-MCNC: NEGATIVE MG/DL
HBA1C MFR BLD: 6.4 % (ref 4–5.6)
HCT VFR BLD AUTO: 45.2 % (ref 42–52)
HDLC SERPL-MCNC: 78 MG/DL
HGB BLD-MCNC: 15.7 G/DL (ref 14–18)
KETONES UR STRIP.AUTO-MCNC: NEGATIVE MG/DL
LDLC SERPL CALC-MCNC: 73 MG/DL
LEUKOCYTE ESTERASE UR QL STRIP.AUTO: NEGATIVE
MCH RBC QN AUTO: 32.6 PG (ref 27–33)
MCHC RBC AUTO-ENTMCNC: 34.7 G/DL (ref 33.7–35.3)
MCV RBC AUTO: 93.8 FL (ref 81.4–97.8)
MICRO URNS: NORMAL
MICROALBUMIN UR-MCNC: 1.4 MG/DL
MICROALBUMIN/CREAT UR: 21 MG/G (ref 0–30)
NITRITE UR QL STRIP.AUTO: NEGATIVE
PH UR STRIP.AUTO: 6.5 [PH] (ref 5–8)
PLATELET # BLD AUTO: 157 K/UL (ref 164–446)
PMV BLD AUTO: 11.1 FL (ref 9–12.9)
POTASSIUM SERPL-SCNC: 4.5 MMOL/L (ref 3.6–5.5)
PROT UR QL STRIP: NEGATIVE MG/DL
PSA SERPL-MCNC: 0.78 NG/ML (ref 0–4)
RBC # BLD AUTO: 4.82 M/UL (ref 4.7–6.1)
RBC UR QL AUTO: NEGATIVE
SODIUM SERPL-SCNC: 140 MMOL/L (ref 135–145)
SP GR UR STRIP.AUTO: 1.01
TRIGL SERPL-MCNC: 78 MG/DL (ref 0–149)
TSH SERPL DL<=0.005 MIU/L-ACNC: 1.14 UIU/ML (ref 0.38–5.33)
UROBILINOGEN UR STRIP.AUTO-MCNC: 0.2 MG/DL
WBC # BLD AUTO: 5 K/UL (ref 4.8–10.8)

## 2021-12-06 PROCEDURE — 84443 ASSAY THYROID STIM HORMONE: CPT

## 2021-12-06 PROCEDURE — 36415 COLL VENOUS BLD VENIPUNCTURE: CPT

## 2021-12-06 PROCEDURE — 82306 VITAMIN D 25 HYDROXY: CPT

## 2021-12-06 PROCEDURE — 83036 HEMOGLOBIN GLYCOSYLATED A1C: CPT

## 2021-12-06 PROCEDURE — 82043 UR ALBUMIN QUANTITATIVE: CPT

## 2021-12-06 PROCEDURE — 80061 LIPID PANEL: CPT

## 2021-12-06 PROCEDURE — 80048 BASIC METABOLIC PNL TOTAL CA: CPT

## 2021-12-06 PROCEDURE — 81003 URINALYSIS AUTO W/O SCOPE: CPT

## 2021-12-06 PROCEDURE — 84460 ALANINE AMINO (ALT) (SGPT): CPT

## 2021-12-06 PROCEDURE — 84153 ASSAY OF PSA TOTAL: CPT

## 2021-12-06 PROCEDURE — 85027 COMPLETE CBC AUTOMATED: CPT

## 2021-12-06 PROCEDURE — 82570 ASSAY OF URINE CREATININE: CPT

## 2021-12-08 LAB — 25(OH)D3 SERPL-MCNC: 44 NG/ML (ref 30–80)

## 2021-12-26 DIAGNOSIS — I10 ESSENTIAL HYPERTENSION: ICD-10-CM

## 2021-12-26 DIAGNOSIS — E11.9 TYPE 2 DIABETES MELLITUS WITHOUT COMPLICATION, WITHOUT LONG-TERM CURRENT USE OF INSULIN (HCC): Chronic | ICD-10-CM

## 2021-12-26 DIAGNOSIS — E78.2 MIXED HYPERLIPIDEMIA: Chronic | ICD-10-CM

## 2021-12-29 RX ORDER — TELMISARTAN 80 MG/1
80 TABLET ORAL
Qty: 90 TABLET | Refills: 3 | Status: SHIPPED | OUTPATIENT
Start: 2021-12-29 | End: 2023-02-24

## 2021-12-29 RX ORDER — ATORVASTATIN CALCIUM 20 MG/1
20 TABLET, FILM COATED ORAL DAILY
Qty: 90 TABLET | Refills: 3 | Status: SHIPPED | OUTPATIENT
Start: 2021-12-29 | End: 2023-02-24

## 2023-02-21 DIAGNOSIS — E78.2 MIXED HYPERLIPIDEMIA: Chronic | ICD-10-CM

## 2023-02-21 DIAGNOSIS — E11.9 TYPE 2 DIABETES MELLITUS WITHOUT COMPLICATION, WITHOUT LONG-TERM CURRENT USE OF INSULIN (HCC): Chronic | ICD-10-CM

## 2023-02-21 DIAGNOSIS — E55.9 VITAMIN D DEFICIENCY: Chronic | ICD-10-CM

## 2023-02-21 DIAGNOSIS — I10 PRIMARY HYPERTENSION: Chronic | ICD-10-CM

## 2023-02-24 DIAGNOSIS — E78.2 MIXED HYPERLIPIDEMIA: Chronic | ICD-10-CM

## 2023-02-24 DIAGNOSIS — I10 ESSENTIAL HYPERTENSION: ICD-10-CM

## 2023-02-24 DIAGNOSIS — Z12.5 SCREENING FOR MALIGNANT NEOPLASM OF PROSTATE: ICD-10-CM

## 2023-02-24 DIAGNOSIS — E11.9 TYPE 2 DIABETES MELLITUS WITHOUT COMPLICATION, WITHOUT LONG-TERM CURRENT USE OF INSULIN (HCC): Chronic | ICD-10-CM

## 2023-02-24 RX ORDER — TELMISARTAN 80 MG/1
80 TABLET ORAL
Qty: 30 TABLET | Refills: 0 | Status: SHIPPED | OUTPATIENT
Start: 2023-02-24 | End: 2023-03-16 | Stop reason: SDUPTHER

## 2023-02-24 RX ORDER — ATORVASTATIN CALCIUM 20 MG/1
20 TABLET, FILM COATED ORAL DAILY
Qty: 30 TABLET | Refills: 0 | Status: SHIPPED | OUTPATIENT
Start: 2023-02-24 | End: 2023-03-16 | Stop reason: SDUPTHER

## 2023-02-28 ENCOUNTER — HOSPITAL ENCOUNTER (OUTPATIENT)
Dept: LAB | Facility: MEDICAL CENTER | Age: 63
End: 2023-02-28
Attending: INTERNAL MEDICINE
Payer: COMMERCIAL

## 2023-02-28 DIAGNOSIS — Z12.5 SCREENING FOR MALIGNANT NEOPLASM OF PROSTATE: ICD-10-CM

## 2023-02-28 DIAGNOSIS — I10 ESSENTIAL HYPERTENSION: ICD-10-CM

## 2023-02-28 DIAGNOSIS — R74.8 LIVER ENZYME ELEVATION: ICD-10-CM

## 2023-02-28 DIAGNOSIS — E11.9 TYPE 2 DIABETES MELLITUS WITHOUT COMPLICATION, WITHOUT LONG-TERM CURRENT USE OF INSULIN (HCC): Chronic | ICD-10-CM

## 2023-02-28 DIAGNOSIS — I10 PRIMARY HYPERTENSION: Chronic | ICD-10-CM

## 2023-02-28 DIAGNOSIS — E55.9 VITAMIN D DEFICIENCY: Chronic | ICD-10-CM

## 2023-02-28 DIAGNOSIS — E78.2 MIXED HYPERLIPIDEMIA: Chronic | ICD-10-CM

## 2023-02-28 LAB
25(OH)D3 SERPL-MCNC: 49 NG/ML (ref 30–100)
ALT SERPL-CCNC: 59 U/L (ref 2–50)
ANION GAP SERPL CALC-SCNC: 13 MMOL/L (ref 7–16)
BUN SERPL-MCNC: 14 MG/DL (ref 8–22)
CALCIUM SERPL-MCNC: 9.9 MG/DL (ref 8.5–10.5)
CHLORIDE SERPL-SCNC: 101 MMOL/L (ref 96–112)
CHOLEST SERPL-MCNC: 146 MG/DL (ref 100–199)
CO2 SERPL-SCNC: 23 MMOL/L (ref 20–33)
CREAT SERPL-MCNC: 0.9 MG/DL (ref 0.5–1.4)
CREAT UR-MCNC: 114.11 MG/DL
ERYTHROCYTE [DISTWIDTH] IN BLOOD BY AUTOMATED COUNT: 39.6 FL (ref 35.9–50)
EST. AVERAGE GLUCOSE BLD GHB EST-MCNC: 169 MG/DL
FASTING STATUS PATIENT QL REPORTED: NORMAL
GFR SERPLBLD CREATININE-BSD FMLA CKD-EPI: 97 ML/MIN/1.73 M 2
GLUCOSE SERPL-MCNC: 200 MG/DL (ref 65–99)
HBA1C MFR BLD: 7.5 % (ref 4–5.6)
HCT VFR BLD AUTO: 45.8 % (ref 42–52)
HDLC SERPL-MCNC: 48 MG/DL
HGB BLD-MCNC: 15.6 G/DL (ref 14–18)
LDLC SERPL CALC-MCNC: 74 MG/DL
MCH RBC QN AUTO: 31.8 PG (ref 27–33)
MCHC RBC AUTO-ENTMCNC: 34.1 G/DL (ref 33.7–35.3)
MCV RBC AUTO: 93.3 FL (ref 81.4–97.8)
MICROALBUMIN UR-MCNC: <1.2 MG/DL
MICROALBUMIN/CREAT UR: NORMAL MG/G (ref 0–30)
PLATELET # BLD AUTO: 189 K/UL (ref 164–446)
PMV BLD AUTO: 11.4 FL (ref 9–12.9)
POTASSIUM SERPL-SCNC: 5 MMOL/L (ref 3.6–5.5)
PSA SERPL-MCNC: 0.95 NG/ML (ref 0–4)
RBC # BLD AUTO: 4.91 M/UL (ref 4.7–6.1)
SODIUM SERPL-SCNC: 137 MMOL/L (ref 135–145)
TRIGL SERPL-MCNC: 122 MG/DL (ref 0–149)
TSH SERPL DL<=0.005 MIU/L-ACNC: 1.75 UIU/ML (ref 0.38–5.33)
WBC # BLD AUTO: 5.5 K/UL (ref 4.8–10.8)

## 2023-02-28 PROCEDURE — 84153 ASSAY OF PSA TOTAL: CPT

## 2023-02-28 PROCEDURE — 80048 BASIC METABOLIC PNL TOTAL CA: CPT

## 2023-02-28 PROCEDURE — 85027 COMPLETE CBC AUTOMATED: CPT

## 2023-02-28 PROCEDURE — 82570 ASSAY OF URINE CREATININE: CPT

## 2023-02-28 PROCEDURE — 36415 COLL VENOUS BLD VENIPUNCTURE: CPT

## 2023-02-28 PROCEDURE — 84460 ALANINE AMINO (ALT) (SGPT): CPT

## 2023-02-28 PROCEDURE — 84443 ASSAY THYROID STIM HORMONE: CPT

## 2023-02-28 PROCEDURE — 83036 HEMOGLOBIN GLYCOSYLATED A1C: CPT

## 2023-02-28 PROCEDURE — 82043 UR ALBUMIN QUANTITATIVE: CPT

## 2023-02-28 PROCEDURE — 82306 VITAMIN D 25 HYDROXY: CPT

## 2023-02-28 PROCEDURE — 80061 LIPID PANEL: CPT

## 2023-03-16 ENCOUNTER — OFFICE VISIT (OUTPATIENT)
Dept: MEDICAL GROUP | Facility: PHYSICIAN GROUP | Age: 63
End: 2023-03-16
Payer: COMMERCIAL

## 2023-03-16 VITALS
WEIGHT: 189 LBS | SYSTOLIC BLOOD PRESSURE: 112 MMHG | HEART RATE: 89 BPM | OXYGEN SATURATION: 98 % | HEIGHT: 67 IN | RESPIRATION RATE: 18 BRPM | TEMPERATURE: 99 F | DIASTOLIC BLOOD PRESSURE: 60 MMHG | BODY MASS INDEX: 29.66 KG/M2

## 2023-03-16 DIAGNOSIS — E78.5 DYSLIPIDEMIA: ICD-10-CM

## 2023-03-16 DIAGNOSIS — E11.9 DIABETES MELLITUS (HCC): ICD-10-CM

## 2023-03-16 DIAGNOSIS — R74.8 LIVER ENZYME ELEVATION: ICD-10-CM

## 2023-03-16 DIAGNOSIS — E66.01 SEVERE OBESITY (HCC): ICD-10-CM

## 2023-03-16 DIAGNOSIS — I10 PRIMARY HYPERTENSION: Chronic | ICD-10-CM

## 2023-03-16 DIAGNOSIS — Z23 NEED FOR VACCINATION: ICD-10-CM

## 2023-03-16 DIAGNOSIS — I10 ESSENTIAL HYPERTENSION: ICD-10-CM

## 2023-03-16 DIAGNOSIS — Z00.00 ANNUAL PHYSICAL EXAM: ICD-10-CM

## 2023-03-16 PROCEDURE — 90471 IMMUNIZATION ADMIN: CPT | Performed by: INTERNAL MEDICINE

## 2023-03-16 PROCEDURE — 90677 PCV20 VACCINE IM: CPT | Performed by: INTERNAL MEDICINE

## 2023-03-16 PROCEDURE — 99396 PREV VISIT EST AGE 40-64: CPT | Mod: 25 | Performed by: INTERNAL MEDICINE

## 2023-03-16 PROCEDURE — 92250 FUNDUS PHOTOGRAPHY W/I&R: CPT | Performed by: INTERNAL MEDICINE

## 2023-03-16 RX ORDER — EMPAGLIFLOZIN 10 MG/1
10 TABLET, FILM COATED ORAL DAILY
Qty: 90 TABLET | Refills: 3 | Status: SHIPPED | OUTPATIENT
Start: 2023-03-16 | End: 2023-06-27

## 2023-03-16 RX ORDER — ATORVASTATIN CALCIUM 20 MG/1
20 TABLET, FILM COATED ORAL DAILY
Qty: 90 TABLET | Refills: 3 | Status: SHIPPED | OUTPATIENT
Start: 2023-03-16 | End: 2023-11-15 | Stop reason: SDUPTHER

## 2023-03-16 RX ORDER — TELMISARTAN 80 MG/1
80 TABLET ORAL
Qty: 90 TABLET | Refills: 3 | Status: SHIPPED | OUTPATIENT
Start: 2023-03-16 | End: 2023-11-15 | Stop reason: SDUPTHER

## 2023-03-16 ASSESSMENT — PATIENT HEALTH QUESTIONNAIRE - PHQ9: CLINICAL INTERPRETATION OF PHQ2 SCORE: 0

## 2023-03-16 NOTE — ASSESSMENT & PLAN NOTE
Chronic ongoing condition.  The patient taking Micardis 80 Mg daily.  Patient tolerating medication well.  No significant side effects noted.  Blood pressure today 1/12/1958.

## 2023-03-16 NOTE — ASSESSMENT & PLAN NOTE
Chronic ongoing condition.  The patient presently taking metformin 500 mg twice daily.  The recent A1c has gone up to 7.5%.  Patient reported that he has been consuming more sweets and starches in the last several months.  I have stressed with the patient importance of diet and exercise and encouraged the patient to lose weight.

## 2023-03-16 NOTE — ASSESSMENT & PLAN NOTE
Chronic condition.  The patient is taking atorvastatin 20 Mg daily.  No significant side effects reported.  Recent lipid panel result discussed with the patient

## 2023-03-16 NOTE — PROGRESS NOTES
PRIMARY CARE CLINIC VISIT    Chief complaint:    Pt is here for Annual Exam      History of Present Illness     Severe obesity (HCC)  Chronic condition.    Body mass index is 29.6 kg/m².   Brief discussion with the patient regarding diet, exercise, and lifestyle modification to help achieve and maintain healthy weight  - on weight loss            Diabetes mellitus (HCC)  Chronic ongoing condition.  The patient presently taking metformin 500 mg twice daily.  The recent A1c has gone up to 7.5%.  Patient reported that he has been consuming more sweets and starches in the last several months.  I have stressed with the patient importance of diet and exercise and encouraged the patient to lose weight.    Liver enzyme elevation  New condition.  Recent blood test show elevated liver enzyme.  Cause unclear needing further investigation.  Lab test result discussed with the patient    Hypertension  Chronic ongoing condition.  The patient taking Micardis 80 Mg daily.  Patient tolerating medication well.  No significant side effects noted.  Blood pressure today 1/12/1958.    Dyslipidemia  Chronic condition.  The patient is taking atorvastatin 20 Mg daily.  No significant side effects reported.  Recent lipid panel result discussed with the patient      Allergies: Patient has no known allergies.    Current Outpatient Medications Ordered in Epic   Medication Sig Dispense Refill    atorvastatin (LIPITOR) 20 MG Tab Take 1 Tablet by mouth every day. 90 Tablet 3    metFORMIN (GLUCOPHAGE) 500 MG Tab Take 2 Tablets by mouth every day. 180 Tablet 3    telmisartan (MICARDIS) 80 MG Tab Take 1 Tablet by mouth every day. 90 Tablet 3    Empagliflozin (JARDIANCE) 10 MG Tab tablet Take 1 Tablet by mouth every day. 90 Tablet 3    vitamin D (CHOLECALCIFEROL) 1000 Unit Tab Take 1 Tab by mouth every day. 90 Tab 3     No current Epic-ordered facility-administered medications on file.       Past Medical History:   Diagnosis Date     "Hyperlipidemia     Hypertension        Past Surgical History:   Procedure Laterality Date    ELBOWPLASTY         Family History   Problem Relation Age of Onset    Cancer Father         Prostate Cancer    Lung Disease Father         COPD       Social History     Tobacco Use   Smoking Status Former    Types: Cigarettes    Quit date: 11/10/2001    Years since quittin.3   Smokeless Tobacco Former       Social History     Substance and Sexual Activity   Alcohol Use Not Currently    Alcohol/week: 8.4 oz    Types: 14 Glasses of wine per week       Review of systems:  As per HPI above. All other systems reviewed and negative.      Past Medical, Social, and Family history reviewed and updated in EPIC     Objective     /60   Pulse 89   Temp 37.2 °C (99 °F) (Temporal)   Resp 18   Ht 1.702 m (5' 7\")   Wt 85.7 kg (189 lb)   SpO2 98%    Body mass index is 29.6 kg/m².    General: alert in no apparent distress.  Cardiovascular: regular rate and rhythm  Pulmonary: lungs : no wheezing   Gastrointestinal: BS present. No obvious mass noted  Monofilament testing with a 10 gram force: sensation intact: intact bilaterally  Visual Inspection: Feet without maceration, ulcers, fissures.  Pedal pulses: intact bilaterally       Lab Results   Component Value Date/Time    HBA1C 7.5 (H) 2023 06:39 AM    HBA1C 6.4 (H) 2021 09:14 AM    HBA1C 6.1 (H) 2020 06:36 AM       Lab Results   Component Value Date/Time    WBC 5.5 2023 06:39 AM    HEMOGLOBIN 15.6 2023 06:39 AM    HEMATOCRIT 45.8 2023 06:39 AM    MCV 93.3 2023 06:39 AM    PLATELETCT 189 2023 06:39 AM         Lab Results   Component Value Date/Time    SODIUM 137 2023 06:39 AM    POTASSIUM 5.0 2023 06:39 AM    GLUCOSE 200 (H) 2023 06:39 AM    BUN 14 2023 06:39 AM    CREATININE 0.90 2023 06:39 AM       Lab Results   Component Value Date/Time    CHOLSTRLTOT 146 2023 06:39 AM    LDL 74 2023 " 06:39 AM    HDL 48 02/28/2023 06:39 AM    TRIGLYCERIDE 122 02/28/2023 06:39 AM       Lab Results   Component Value Date/Time    ALTSGPT 59 (H) 02/28/2023 06:39 AM           Assessment and Plan     1. Annual physical exam    2. Diabetes mellitus (HCC)  - Diabetic Monofilament LE Exam  - POCT Retinal Eye Exam  - metFORMIN (GLUCOPHAGE) 500 MG Tab; Take 2 Tablets by mouth every day.  Dispense: 180 Tablet; Refill: 3    Chronic condition.  Worsening control compared to previousLAB.  A1c 7.5%.  Recommend to start Jardiance 10 mg daily.  Potential side effects of Jardiance discussed with the patient.  Continue with metformin 500 mg twice daily.  A1c goal of 7% discussed.  Basic physiology of Diabetes was explained to patient as well as possible microvascular/macrovascular complications.  Pt's education:   The importance of increasing physical activity to improve diabetes control was discussed with the patient.   Patient was also educated on changing diet and making better choices to help control blood sugar.   Discussed FBG goal of 100-120, 2hr PP <180     Other orders  - Empagliflozin (JARDIANCE) 10 MG Tab tablet; Take 1 Tablet by mouth every day.  Dispense: 90 Tablet; Refill: 3          3. Dyslipidemia  - atorvastatin (LIPITOR) 20 MG Tab; Take 1 Tablet by mouth every day.  Dispense: 90 Tablet; Refill: 3  Chronic condition.  Stable.  Continue with atorvastatin 20 Mg daily.  Lab test result discussed with the patient.    4. Essential hypertension  - telmisartan (MICARDIS) 80 MG Tab; Take 1 Tablet by mouth every day.  Dispense: 90 Tablet; Refill: 3  Chronic stable condition.  Continue with Micardis 80 mg daily.  Continue to monitor blood pressure on regular basis.    5. Severe obesity (HCC)  Chronic condition.  Uncontrolled.  Advised the patient healthy diet and exercise.  Encouraged weight loss.    6. Liver enzyme elevation  This is a new condition.  Cause unclear.  Consideration including but not limited to hepatosteatosis  versus underlying hepatocellular disorder.  Hepatitis serology requested.  Liver ultrasound also ordered.  Recommend follow-up after tests are done.    7. Need for vaccination  - Pneumococcal Conjugate Vaccine 20-Valent (19 yrs+)            ANTICIPATORY GUIDANCE  Patient Counseling:  -Cholesterol screening. Fasting lipid panel  -Diet: advised lean meats, fruits, vegetables, whole grains  -Discussed Healthful lifestyle measures. Pt to avoid tobacco, ETOH, and drug use.  -Pt to continue with regular exercise/walking activities  -Advised sun protection, sunscreen use  -Injury prevention: discussed safety seat belts  -Discussed preventive immunizations  -Recommend patient to schedule a yearly eye examination and dental exam         Follow-up 3 months              Please note that this dictation was created using voice recognition software. I have made every reasonable attempt to correct obvious errors, but I expect that there are errors of grammar and possibly content that I did not discover before finalizing the note.    Donnie Herman MD  Internal Medicine  [facility]

## 2023-03-16 NOTE — ASSESSMENT & PLAN NOTE
New condition.  Recent blood test show elevated liver enzyme.  Cause unclear needing further investigation.  Lab test result discussed with the patient

## 2023-03-16 NOTE — ASSESSMENT & PLAN NOTE
Chronic condition.    Body mass index is 29.6 kg/m².   Brief discussion with the patient regarding diet, exercise, and lifestyle modification to help achieve and maintain healthy weight  - on weight loss

## 2023-03-20 LAB — RETINAL SCREEN: NEGATIVE

## 2023-03-27 ENCOUNTER — HOSPITAL ENCOUNTER (OUTPATIENT)
Dept: RADIOLOGY | Facility: MEDICAL CENTER | Age: 63
End: 2023-03-27
Attending: INTERNAL MEDICINE
Payer: COMMERCIAL

## 2023-03-27 DIAGNOSIS — R74.8 LIVER ENZYME ELEVATION: ICD-10-CM

## 2023-03-27 PROBLEM — K76.0 HEPATIC STEATOSIS: Status: ACTIVE | Noted: 2023-03-27

## 2023-03-27 PROCEDURE — 76705 ECHO EXAM OF ABDOMEN: CPT

## 2023-06-22 ENCOUNTER — HOSPITAL ENCOUNTER (OUTPATIENT)
Dept: LAB | Facility: MEDICAL CENTER | Age: 63
End: 2023-06-22
Attending: INTERNAL MEDICINE
Payer: COMMERCIAL

## 2023-06-22 DIAGNOSIS — E11.9 TYPE 2 DIABETES MELLITUS WITHOUT COMPLICATION, WITHOUT LONG-TERM CURRENT USE OF INSULIN (HCC): Chronic | ICD-10-CM

## 2023-06-22 DIAGNOSIS — R74.8 ALKALINE PHOSPHATASE ELEVATION: Chronic | ICD-10-CM

## 2023-06-22 DIAGNOSIS — R74.8 LIVER ENZYME ELEVATION: ICD-10-CM

## 2023-06-22 DIAGNOSIS — I10 PRIMARY HYPERTENSION: Chronic | ICD-10-CM

## 2023-06-22 DIAGNOSIS — E78.2 MIXED HYPERLIPIDEMIA: Chronic | ICD-10-CM

## 2023-06-22 LAB
ALBUMIN SERPL BCP-MCNC: 4.5 G/DL (ref 3.2–4.9)
ALP SERPL-CCNC: 111 U/L (ref 30–99)
ALT SERPL-CCNC: 26 U/L (ref 2–50)
ANION GAP SERPL CALC-SCNC: 14 MMOL/L (ref 7–16)
AST SERPL-CCNC: 21 U/L (ref 12–45)
BILIRUB CONJ SERPL-MCNC: <0.2 MG/DL (ref 0.1–0.5)
BILIRUB INDIRECT SERPL-MCNC: ABNORMAL MG/DL (ref 0–1)
BILIRUB SERPL-MCNC: 0.5 MG/DL (ref 0.1–1.5)
BUN SERPL-MCNC: 17 MG/DL (ref 8–22)
CALCIUM SERPL-MCNC: 10.1 MG/DL (ref 8.5–10.5)
CHLORIDE SERPL-SCNC: 104 MMOL/L (ref 96–112)
CHOLEST SERPL-MCNC: 147 MG/DL (ref 100–199)
CO2 SERPL-SCNC: 24 MMOL/L (ref 20–33)
CREAT SERPL-MCNC: 0.94 MG/DL (ref 0.5–1.4)
CREAT UR-MCNC: 50.95 MG/DL
ERYTHROCYTE [DISTWIDTH] IN BLOOD BY AUTOMATED COUNT: 40.8 FL (ref 35.9–50)
EST. AVERAGE GLUCOSE BLD GHB EST-MCNC: 131 MG/DL
FASTING STATUS PATIENT QL REPORTED: NORMAL
GFR SERPLBLD CREATININE-BSD FMLA CKD-EPI: 92 ML/MIN/1.73 M 2
GLUCOSE SERPL-MCNC: 115 MG/DL (ref 65–99)
HBA1C MFR BLD: 6.2 % (ref 4–5.6)
HBV SURFACE AB SERPL IA-ACNC: <3.5 MIU/ML (ref 0–10)
HBV SURFACE AG SER QL: NORMAL
HCT VFR BLD AUTO: 49.3 % (ref 42–52)
HCV AB SER QL: NORMAL
HDLC SERPL-MCNC: 46 MG/DL
HGB BLD-MCNC: 16.1 G/DL (ref 14–18)
LDLC SERPL CALC-MCNC: 86 MG/DL
MCH RBC QN AUTO: 30 PG (ref 27–33)
MCHC RBC AUTO-ENTMCNC: 32.7 G/DL (ref 32.3–36.5)
MCV RBC AUTO: 91.8 FL (ref 81.4–97.8)
MICROALBUMIN UR-MCNC: <1.2 MG/DL
MICROALBUMIN/CREAT UR: NORMAL MG/G (ref 0–30)
PLATELET # BLD AUTO: 164 K/UL (ref 164–446)
PMV BLD AUTO: 12.5 FL (ref 9–12.9)
POTASSIUM SERPL-SCNC: 5.3 MMOL/L (ref 3.6–5.5)
PROT SERPL-MCNC: 7.4 G/DL (ref 6–8.2)
RBC # BLD AUTO: 5.37 M/UL (ref 4.7–6.1)
SODIUM SERPL-SCNC: 142 MMOL/L (ref 135–145)
TRIGL SERPL-MCNC: 77 MG/DL (ref 0–149)
WBC # BLD AUTO: 5.4 K/UL (ref 4.8–10.8)

## 2023-06-22 PROCEDURE — 86803 HEPATITIS C AB TEST: CPT

## 2023-06-22 PROCEDURE — 80061 LIPID PANEL: CPT

## 2023-06-22 PROCEDURE — 86706 HEP B SURFACE ANTIBODY: CPT

## 2023-06-22 PROCEDURE — 82570 ASSAY OF URINE CREATININE: CPT

## 2023-06-22 PROCEDURE — 83036 HEMOGLOBIN GLYCOSYLATED A1C: CPT

## 2023-06-22 PROCEDURE — 80076 HEPATIC FUNCTION PANEL: CPT

## 2023-06-22 PROCEDURE — 80048 BASIC METABOLIC PNL TOTAL CA: CPT

## 2023-06-22 PROCEDURE — 36415 COLL VENOUS BLD VENIPUNCTURE: CPT

## 2023-06-22 PROCEDURE — 85027 COMPLETE CBC AUTOMATED: CPT

## 2023-06-22 PROCEDURE — 87340 HEPATITIS B SURFACE AG IA: CPT

## 2023-06-22 PROCEDURE — 82043 UR ALBUMIN QUANTITATIVE: CPT

## 2023-06-23 ENCOUNTER — HOSPITAL ENCOUNTER (OUTPATIENT)
Dept: LAB | Facility: MEDICAL CENTER | Age: 63
End: 2023-06-23
Attending: INTERNAL MEDICINE
Payer: COMMERCIAL

## 2023-06-23 DIAGNOSIS — R74.8 ALKALINE PHOSPHATASE ELEVATION: Chronic | ICD-10-CM

## 2023-06-23 PROCEDURE — 84075 ASSAY ALKALINE PHOSPHATASE: CPT

## 2023-06-23 PROCEDURE — 84080 ASSAY ALKALINE PHOSPHATASES: CPT

## 2023-06-23 PROCEDURE — 36415 COLL VENOUS BLD VENIPUNCTURE: CPT

## 2023-06-27 ENCOUNTER — OFFICE VISIT (OUTPATIENT)
Dept: MEDICAL GROUP | Facility: PHYSICIAN GROUP | Age: 63
End: 2023-06-27
Payer: COMMERCIAL

## 2023-06-27 VITALS
SYSTOLIC BLOOD PRESSURE: 102 MMHG | DIASTOLIC BLOOD PRESSURE: 60 MMHG | HEART RATE: 57 BPM | HEIGHT: 67 IN | OXYGEN SATURATION: 100 % | WEIGHT: 167.5 LBS | RESPIRATION RATE: 20 BRPM | BODY MASS INDEX: 26.29 KG/M2 | TEMPERATURE: 97.6 F

## 2023-06-27 DIAGNOSIS — E11.9 TYPE 2 DIABETES MELLITUS WITHOUT COMPLICATION, WITHOUT LONG-TERM CURRENT USE OF INSULIN (HCC): Chronic | ICD-10-CM

## 2023-06-27 DIAGNOSIS — I10 PRIMARY HYPERTENSION: Chronic | ICD-10-CM

## 2023-06-27 DIAGNOSIS — R74.8 ALKALINE PHOSPHATASE ELEVATION: Chronic | ICD-10-CM

## 2023-06-27 DIAGNOSIS — E55.9 VITAMIN D DEFICIENCY: Chronic | ICD-10-CM

## 2023-06-27 DIAGNOSIS — Z12.11 SCREENING FOR COLORECTAL CANCER: ICD-10-CM

## 2023-06-27 DIAGNOSIS — Z12.12 SCREENING FOR COLORECTAL CANCER: ICD-10-CM

## 2023-06-27 DIAGNOSIS — E78.5 DYSLIPIDEMIA: Chronic | ICD-10-CM

## 2023-06-27 LAB
ALP BONE SERPL-CCNC: 31 U/L (ref 0–55)
ALP ISOS SERPL HS-CCNC: 0 U/L
ALP LIVER SERPL-CCNC: 80 U/L (ref 0–94)
ALP SERPL-CCNC: 111 U/L (ref 40–120)

## 2023-06-27 PROCEDURE — 3078F DIAST BP <80 MM HG: CPT | Performed by: INTERNAL MEDICINE

## 2023-06-27 PROCEDURE — 99214 OFFICE O/P EST MOD 30 MIN: CPT | Performed by: INTERNAL MEDICINE

## 2023-06-27 PROCEDURE — 3074F SYST BP LT 130 MM HG: CPT | Performed by: INTERNAL MEDICINE

## 2023-06-27 ASSESSMENT — FIBROSIS 4 INDEX: FIB4 SCORE: 1.53

## 2023-06-27 NOTE — ASSESSMENT & PLAN NOTE
This is a new condition.  Recent lab test shows slightly elevated alkaline phosphatase level.  Lab test result discussed with the patient.  Hepatitis panel B and C came back negative.  I have ordered alkaline phosphatase isoenzymes to be done.  The results still pending.

## 2023-06-27 NOTE — ASSESSMENT & PLAN NOTE
Chronic condition.  The patient taking Micardis 80 Mg daily.  Blood pressure has been well controlled.  Patient tolerating medication well.

## 2023-06-27 NOTE — PROGRESS NOTES
PRIMARY CARE CLINIC VISIT    Chief complaint:    Follow-up diabetes  Follow-up hypertension and hyperlipidemia  Lab test result      History of Present Illness     Diabetes mellitus (HCC)  This is a chronic condition.  The patient metformin 1000 mg daily and Jardiance 10 mg daily.  With diet and exercise the patient stated that he has lost approximately 20 pounds.  The A1c now dropped to 6.2%.  The patient was congratulated for the job well done.    Alkaline phosphatase elevation  This is a new condition.  Recent lab test shows slightly elevated alkaline phosphatase level.  Lab test result discussed with the patient.  Hepatitis panel B and C came back negative.  I have ordered alkaline phosphatase isoenzymes to be done.  The results still pending.    Hypertension  Chronic condition.  The patient taking Micardis 80 Mg daily.  Blood pressure has been well controlled.  Patient tolerating medication well.    Dyslipidemia  Chronic stable condition.  The patient being treated with Lipitor 20 Mg daily.    Vitamin D deficiency disease  This is a chronic condition but the patient takes vitamin D supplementation.    Current Outpatient Medications on File Prior to Visit   Medication Sig Dispense Refill    atorvastatin (LIPITOR) 20 MG Tab Take 1 Tablet by mouth every day. 90 Tablet 3    metFORMIN (GLUCOPHAGE) 500 MG Tab Take 2 Tablets by mouth every day. 180 Tablet 3    telmisartan (MICARDIS) 80 MG Tab Take 1 Tablet by mouth every day. 90 Tablet 3    vitamin D (CHOLECALCIFEROL) 1000 Unit Tab Take 1 Tab by mouth every day. 90 Tab 3     No current facility-administered medications on file prior to visit.        Allergies: Patient has no known allergies.    Current Outpatient Medications Ordered in Epic   Medication Sig Dispense Refill    atorvastatin (LIPITOR) 20 MG Tab Take 1 Tablet by mouth every day. 90 Tablet 3    metFORMIN (GLUCOPHAGE) 500 MG Tab Take 2 Tablets by mouth every day. 180 Tablet 3    telmisartan (MICARDIS) 80 MG  "Tab Take 1 Tablet by mouth every day. 90 Tablet 3    vitamin D (CHOLECALCIFEROL) 1000 Unit Tab Take 1 Tab by mouth every day. 90 Tab 3     No current Epic-ordered facility-administered medications on file.       Past Medical History:   Diagnosis Date    Hyperlipidemia     Hypertension        Past Surgical History:   Procedure Laterality Date    ELBOWPLASTY         Family History   Problem Relation Age of Onset    Cancer Father         Prostate Cancer    Lung Disease Father         COPD       Social History     Tobacco Use   Smoking Status Former    Types: Cigarettes    Quit date: 11/10/2001    Years since quittin.6   Smokeless Tobacco Former   Vaping Use    Vaping Use: Never used       Social History     Substance and Sexual Activity   Alcohol Use Not Currently    Alcohol/week: 8.4 oz    Types: 14 Glasses of wine per week       Review of systems.  As per HPI above. All other systems reviewed and negative.      Past Medical, Social, and Family history reviewed and updated in EPIC     Objective     /60 (BP Location: Left arm, Patient Position: Sitting, BP Cuff Size: Adult)   Pulse (!) 57   Temp 36.4 °C (97.6 °F) (Temporal)   Resp 20   Ht 1.702 m (5' 7\")   Wt 76 kg (167 lb 8 oz)   SpO2 100%    Body mass index is 26.23 kg/m².    General: alert in no apparent distress.  Cardiovascular: regular rate and rhythm  Pulmonary: lungs : no wheezing   Gastrointestinal: BS present. No obvious mass noted        Lab Results   Component Value Date/Time    HBA1C 6.2 (H) 2023 07:32 AM    HBA1C 7.5 (H) 2023 06:39 AM    HBA1C 6.4 (H) 2021 09:14 AM       Lab Results   Component Value Date/Time    WBC 5.4 2023 07:32 AM    HEMOGLOBIN 16.1 2023 07:32 AM    HEMATOCRIT 49.3 2023 07:32 AM    MCV 91.8 2023 07:32 AM    PLATELETCT 164 2023 07:32 AM         Lab Results   Component Value Date/Time    SODIUM 142 2023 07:32 AM    POTASSIUM 5.3 2023 07:32 AM    GLUCOSE 115 (H) " 06/22/2023 07:32 AM    BUN 17 06/22/2023 07:32 AM    CREATININE 0.94 06/22/2023 07:32 AM       Lab Results   Component Value Date/Time    CHOLSTRLTOT 147 06/22/2023 07:32 AM    TRIGLYCERIDE 77 06/22/2023 07:32 AM    HDL 46 06/22/2023 07:32 AM    LDL 86 06/22/2023 07:32 AM       Lab Results   Component Value Date/Time    ALTSGPT 26 06/22/2023 07:32 AM             Assessment and Plan     1. Type 2 diabetes mellitus without complication, without long-term current use of insulin (HCC)    Chronic condition.  Excellent control with A1c 6.2%.  Shared decision making.  Patient will stop taking Jardiance.  Continue to take metformin 1000 mg daily.  Follow up in 4 months for lab test prior    A1c goal of 7% discussed.  Basic physiology of Diabetes was explained to patient as well as possible microvascular/macrovascular complications.  Pt's education:   The importance of increasing physical activity to improve diabetes control was discussed with the patient.   Patient was also educated on changing diet and making better choices to help control blood sugar.   Discussed FBG goal of 100-120, 2hr PP <180       - HEMOGLOBIN A1C; Future  - Basic Metabolic Panel; Future  - MICROALBUMIN CREAT RATIO URINE; Future    2. Alkaline phosphatase elevation  - HEPATIC FUNCTION PANEL; Future  This is a new condition.  The isoenzymes are still pending.  Continue to monitor.  Patient asymptomatic.    3. Primary hypertension  Chronic stable condition per continue with Micardis 80 Mg daily.    4. Dyslipidemia  - Lipid Profile; Future  Chronic stable condition.  Continue with atorvastatin 20 Mg daily.    5. Vitamin D deficiency disease  - VITAMIN D,25 HYDROXY (DEFICIENCY); Future  Chronic condition.  Lab test ordered for follow-up.    6. Screening for colorectal cancer  - Referral to GI for Colonoscopy                        Please note that this dictation was created using voice recognition software. I have made every reasonable attempt to correct  obvious errors, but I expect that there are errors of grammar and possibly content that I did not discover before finalizing the note.    Donnie Herman MD  Internal Medicine  [facility]

## 2023-06-27 NOTE — ASSESSMENT & PLAN NOTE
This is a chronic condition.  The patient metformin 1000 mg daily and Jardiance 10 mg daily.  With diet and exercise the patient stated that he has lost approximately 20 pounds.  The A1c now dropped to 6.2%.  The patient was congratulated for the job well done.

## 2023-09-25 DIAGNOSIS — L98.9 SKIN LESION: ICD-10-CM

## 2023-10-24 ENCOUNTER — HOSPITAL ENCOUNTER (OUTPATIENT)
Dept: LAB | Facility: MEDICAL CENTER | Age: 63
End: 2023-10-24
Attending: INTERNAL MEDICINE
Payer: COMMERCIAL

## 2023-10-24 DIAGNOSIS — E55.9 VITAMIN D DEFICIENCY: Chronic | ICD-10-CM

## 2023-10-24 DIAGNOSIS — E78.5 DYSLIPIDEMIA: Chronic | ICD-10-CM

## 2023-10-24 DIAGNOSIS — R74.8 ALKALINE PHOSPHATASE ELEVATION: Chronic | ICD-10-CM

## 2023-10-24 DIAGNOSIS — E11.9 TYPE 2 DIABETES MELLITUS WITHOUT COMPLICATION, WITHOUT LONG-TERM CURRENT USE OF INSULIN (HCC): Chronic | ICD-10-CM

## 2023-10-24 LAB
25(OH)D3 SERPL-MCNC: 60 NG/ML (ref 30–100)
ALBUMIN SERPL BCP-MCNC: 5 G/DL (ref 3.2–4.9)
ALP SERPL-CCNC: 78 U/L (ref 30–99)
ALT SERPL-CCNC: 28 U/L (ref 2–50)
ANION GAP SERPL CALC-SCNC: 11 MMOL/L (ref 7–16)
AST SERPL-CCNC: 26 U/L (ref 12–45)
BILIRUB CONJ SERPL-MCNC: <0.2 MG/DL (ref 0.1–0.5)
BILIRUB INDIRECT SERPL-MCNC: ABNORMAL MG/DL (ref 0–1)
BILIRUB SERPL-MCNC: 0.7 MG/DL (ref 0.1–1.5)
BUN SERPL-MCNC: 11 MG/DL (ref 8–22)
CALCIUM SERPL-MCNC: 10 MG/DL (ref 8.5–10.5)
CHLORIDE SERPL-SCNC: 102 MMOL/L (ref 96–112)
CHOLEST SERPL-MCNC: 177 MG/DL (ref 100–199)
CO2 SERPL-SCNC: 27 MMOL/L (ref 20–33)
CREAT SERPL-MCNC: 0.92 MG/DL (ref 0.5–1.4)
CREAT UR-MCNC: 149.37 MG/DL
EST. AVERAGE GLUCOSE BLD GHB EST-MCNC: 111 MG/DL
FASTING STATUS PATIENT QL REPORTED: NORMAL
GFR SERPLBLD CREATININE-BSD FMLA CKD-EPI: 94 ML/MIN/1.73 M 2
GLUCOSE SERPL-MCNC: 123 MG/DL (ref 65–99)
HBA1C MFR BLD: 5.5 % (ref 4–5.6)
HDLC SERPL-MCNC: 79 MG/DL
LDLC SERPL CALC-MCNC: 88 MG/DL
MICROALBUMIN UR-MCNC: 3 MG/DL
MICROALBUMIN/CREAT UR: 20 MG/G (ref 0–30)
POTASSIUM SERPL-SCNC: 5.2 MMOL/L (ref 3.6–5.5)
PROT SERPL-MCNC: 7.1 G/DL (ref 6–8.2)
SODIUM SERPL-SCNC: 140 MMOL/L (ref 135–145)
TRIGL SERPL-MCNC: 52 MG/DL (ref 0–149)

## 2023-10-24 PROCEDURE — 80076 HEPATIC FUNCTION PANEL: CPT

## 2023-10-24 PROCEDURE — 80048 BASIC METABOLIC PNL TOTAL CA: CPT

## 2023-10-24 PROCEDURE — 82306 VITAMIN D 25 HYDROXY: CPT

## 2023-10-24 PROCEDURE — 83036 HEMOGLOBIN GLYCOSYLATED A1C: CPT

## 2023-10-24 PROCEDURE — 36415 COLL VENOUS BLD VENIPUNCTURE: CPT

## 2023-10-24 PROCEDURE — 82570 ASSAY OF URINE CREATININE: CPT

## 2023-10-24 PROCEDURE — 82043 UR ALBUMIN QUANTITATIVE: CPT

## 2023-10-24 PROCEDURE — 80061 LIPID PANEL: CPT

## 2023-11-15 ENCOUNTER — OFFICE VISIT (OUTPATIENT)
Dept: MEDICAL GROUP | Facility: PHYSICIAN GROUP | Age: 63
End: 2023-11-15
Payer: COMMERCIAL

## 2023-11-15 VITALS
SYSTOLIC BLOOD PRESSURE: 112 MMHG | DIASTOLIC BLOOD PRESSURE: 78 MMHG | WEIGHT: 172.2 LBS | OXYGEN SATURATION: 98 % | TEMPERATURE: 98.8 F | BODY MASS INDEX: 27.03 KG/M2 | HEIGHT: 67 IN | HEART RATE: 64 BPM | RESPIRATION RATE: 18 BRPM

## 2023-11-15 DIAGNOSIS — I10 ESSENTIAL HYPERTENSION: ICD-10-CM

## 2023-11-15 DIAGNOSIS — E78.5 TYPE 2 DIABETES MELLITUS WITH HYPERLIPIDEMIA (HCC): ICD-10-CM

## 2023-11-15 DIAGNOSIS — E55.9 VITAMIN D DEFICIENCY: Chronic | ICD-10-CM

## 2023-11-15 DIAGNOSIS — I10 PRIMARY HYPERTENSION: Chronic | ICD-10-CM

## 2023-11-15 DIAGNOSIS — K76.0 HEPATIC STEATOSIS: ICD-10-CM

## 2023-11-15 DIAGNOSIS — E11.69 TYPE 2 DIABETES MELLITUS WITH HYPERLIPIDEMIA (HCC): ICD-10-CM

## 2023-11-15 DIAGNOSIS — E78.5 DYSLIPIDEMIA: ICD-10-CM

## 2023-11-15 DIAGNOSIS — Z23 NEED FOR VACCINATION: ICD-10-CM

## 2023-11-15 PROBLEM — R74.8 ALKALINE PHOSPHATASE ELEVATION: Status: RESOLVED | Noted: 2023-06-22 | Resolved: 2023-11-15

## 2023-11-15 PROCEDURE — 3078F DIAST BP <80 MM HG: CPT | Performed by: INTERNAL MEDICINE

## 2023-11-15 PROCEDURE — 99214 OFFICE O/P EST MOD 30 MIN: CPT | Performed by: INTERNAL MEDICINE

## 2023-11-15 PROCEDURE — 3074F SYST BP LT 130 MM HG: CPT | Performed by: INTERNAL MEDICINE

## 2023-11-15 RX ORDER — ATORVASTATIN CALCIUM 20 MG/1
20 TABLET, FILM COATED ORAL DAILY
Qty: 90 TABLET | Refills: 3 | Status: SHIPPED | OUTPATIENT
Start: 2023-11-15 | End: 2024-03-03 | Stop reason: SDUPTHER

## 2023-11-15 RX ORDER — TELMISARTAN 80 MG/1
80 TABLET ORAL
Qty: 90 TABLET | Refills: 3 | Status: SHIPPED | OUTPATIENT
Start: 2023-11-15 | End: 2024-03-03 | Stop reason: SDUPTHER

## 2023-11-15 ASSESSMENT — FIBROSIS 4 INDEX: FIB4 SCORE: 1.83

## 2023-11-15 NOTE — ASSESSMENT & PLAN NOTE
Chronic condition.  Patient is taking Micardis 80 Mg daily.  Pt reports compliance and tolerance with current medication(s)   Blood pressure has been well controlled per patient report

## 2023-11-15 NOTE — PROGRESS NOTES
PRIMARY CARE CLINIC VISIT        Chief Complaint   Patient presents with    Follow-Up      Follow-up diabetes  Hypertension  Hyperlipidemia   vitamin D deficiency  Hepatic steatosis  Vaccination status        History of Present Illness     Type 2 diabetes mellitus with hyperlipidemia (HCC)  This is a chronic condition.  The patient currently being treated with metformin 1000 mg daily.      Most recent blood test show A1c below 6%.  Patient tolerating medication well.    Hypertension  Chronic condition.  Patient is taking Micardis 80 Mg daily.  Pt reports compliance and tolerance with current medication(s)   Blood pressure has been well controlled per patient report    Dyslipidemia  Chronic condition.  Patient is being treated with atorvastatin 20 Mg daily.  Patient tolerating medication well.    Vitamin D deficiency disease  Chronic condition for the patient take vitamin D supplementation daily.  No new symptoms reported.    Hepatic steatosis  Chronic condition noted with previous ultrasound.  Recommend diet and lifestyle modification.  Continue to monitor.  Patient currently asymptomatic    Need for vaccination  Patient is due for hepatitis B and shingle vaccine.  The patient declined these vaccines at this time    Current Outpatient Medications on File Prior to Visit   Medication Sig Dispense Refill    vitamin D (CHOLECALCIFEROL) 1000 Unit Tab Take 1 Tab by mouth every day. 90 Tab 3     No current facility-administered medications on file prior to visit.        Allergies: Patient has no known allergies.    Current Outpatient Medications Ordered in Epic   Medication Sig Dispense Refill    metFORMIN (GLUCOPHAGE) 500 MG Tab Take 2 Tablets by mouth every day. 90 Tablet 3    atorvastatin (LIPITOR) 20 MG Tab Take 1 Tablet by mouth every day. 90 Tablet 3    telmisartan (MICARDIS) 80 MG Tab Take 1 Tablet by mouth every day. 90 Tablet 3    vitamin D (CHOLECALCIFEROL) 1000 Unit Tab Take 1 Tab by mouth every day. 90 Tab 3  "    No current [facility]-ordered facility-administered medications on file.       Past Medical History:   Diagnosis Date    Hyperlipidemia     Hypertension        Past Surgical History:   Procedure Laterality Date    ELBOWPLASTY         Family History   Problem Relation Age of Onset    Cancer Father         Prostate Cancer    Lung Disease Father         COPD       Social History     Tobacco Use   Smoking Status Former    Current packs/day: 0.00    Types: Cigarettes    Quit date: 11/10/2001    Years since quittin.0   Smokeless Tobacco Former       Social History     Substance and Sexual Activity   Alcohol Use Not Currently    Alcohol/week: 8.4 oz    Types: 14 Glasses of wine per week       Review of systems.  As per HPI above. All other systems reviewed and negative.      Past Medical, Social, and Family history reviewed and updated in EPIC     Objective     /78 (BP Location: Right arm, Patient Position: Sitting, BP Cuff Size: Adult)   Pulse 64   Temp 37.1 °C (98.8 °F) (Temporal)   Resp 18   Ht 1.702 m (5' 7\")   Wt 78.1 kg (172 lb 3.2 oz)   SpO2 98%    Body mass index is 26.97 kg/m².    General: alert in no apparent distress.  Cardiovascular: regular rate and rhythm  Pulmonary: lungs : no wheezing   Gastrointestinal: BS present.         Lab Results   Component Value Date/Time    HBA1C 5.5 10/24/2023 06:23 AM    HBA1C 6.2 (H) 2023 07:32 AM    HBA1C 7.5 (H) 2023 06:39 AM       Lab Results   Component Value Date/Time    WBC 5.4 2023 07:32 AM    HEMOGLOBIN 16.1 2023 07:32 AM    HEMATOCRIT 49.3 2023 07:32 AM    MCV 91.8 2023 07:32 AM    PLATELETCT 164 2023 07:32 AM         Lab Results   Component Value Date/Time    SODIUM 140 10/24/2023 06:23 AM    POTASSIUM 5.2 10/24/2023 06:23 AM    GLUCOSE 123 (H) 10/24/2023 06:23 AM    BUN 11 10/24/2023 06:23 AM    CREATININE 0.92 10/24/2023 06:23 AM       Lab Results   Component Value Date/Time    CHOLSTRLTOT 177 10/24/2023 06:23 " AM    TRIGLYCERIDE 52 10/24/2023 06:23 AM    HDL 79 10/24/2023 06:23 AM    LDL 88 10/24/2023 06:23 AM       Lab Results   Component Value Date/Time    ALTSGPT 28 10/24/2023 06:23 AM             Assessment and Plan     1. Type 2 diabetes mellitus with hyperlipidemia (HCC)  - metFORMIN (GLUCOPHAGE) 500 MG Tab; Take 2 Tablets by mouth every day.  Dispense: 90 Tablet; Refill: 3  - HEMOGLOBIN A1C; Future  - Basic Metabolic Panel; Future  - MICROALBUMIN CREAT RATIO URINE; Future  Chronic condition.  Excellent control per A1c 5.5%.  At this time I would like to reduce the metformin from 1000 mg daily to 500 Mg daily.  A1c goal of 7% discussed.  Basic physiology of Diabetes was explained to patient as well as possible microvascular/macrovascular complications.  Pt's education:   The importance of increasing physical activity to improve diabetes control was discussed with the patient.   Patient was also educated on changing diet and making better choices to help control blood sugar.   Discussed FBG goal of 100-120, 2hr PP <180       2. Dyslipidemia  - ALANINE AMINO-TRANS; Future  - Lipid Profile; Future  - atorvastatin (LIPITOR) 20 MG Tab; Take 1 Tablet by mouth every day.  Dispense: 90 Tablet; Refill: 3  Chronic stable condition.  Continue atorvastatin 20 Mg daily    3. Essential hypertension  - telmisartan (MICARDIS) 80 MG Tab; Take 1 Tablet by mouth every day.  Dispense: 90 Tablet; Refill: 3  Chronic stable.  Continue Micardis 80 Mg daily    4. Vitamin D deficiency disease  Chronic stable condition.  Continue vitamin D supplementation.    5. Hepatic steatosis  Chronic condition.  Advised the patient to maintain ideal weight and to avoid/reduce alcohol intake.  Continue to monitor.                  Healthcare Maintenance       Health Maintenance Due   Topic Date Due    HIV Screening  Never done    Zoster (Shingles) Vaccines (1 of 2) Never done    Hepatitis B Vaccine (Hep B) (1 of 3 - Risk 3-dose series) Never done                Please note that this dictation was created using voice recognition software. I have made every reasonable attempt to correct obvious errors, but I expect that there are errors of grammar and possibly content that I did not discover before finalizing the note.    Donnie Herman MD  Internal Medicine  [facility]

## 2023-11-15 NOTE — ASSESSMENT & PLAN NOTE
Chronic condition for the patient take vitamin D supplementation daily.  No new symptoms reported.

## 2023-11-15 NOTE — ASSESSMENT & PLAN NOTE
Chronic condition noted with previous ultrasound.  Recommend diet and lifestyle modification.  Continue to monitor.  Patient currently asymptomatic

## 2023-11-15 NOTE — ASSESSMENT & PLAN NOTE
Patient is due for hepatitis B and shingle vaccine.  The patient declined these vaccines at this time

## 2023-11-15 NOTE — ASSESSMENT & PLAN NOTE
Chronic condition.  Patient is being treated with atorvastatin 20 Mg daily.  Patient tolerating medication well.

## 2024-01-03 ENCOUNTER — OFFICE VISIT (OUTPATIENT)
Dept: DERMATOLOGY | Facility: IMAGING CENTER | Age: 64
End: 2024-01-03
Payer: COMMERCIAL

## 2024-01-03 DIAGNOSIS — L57.0 ACTINIC KERATOSIS: ICD-10-CM

## 2024-01-03 PROCEDURE — 17000 DESTRUCT PREMALG LESION: CPT | Performed by: NURSE PRACTITIONER

## 2024-01-03 PROCEDURE — 17003 DESTRUCT PREMALG LES 2-14: CPT | Performed by: NURSE PRACTITIONER

## 2024-01-03 RX ORDER — ASPIRIN 81 MG/1
TABLET ORAL
COMMUNITY

## 2024-01-03 NOTE — PROGRESS NOTES
"DERMATOLOGY NOTE  NEW VISIT       Chief complaint: Establish Care (RADHA)    Pt Declined Full Skin Exam, would like to just focus on spots     Skin lesion   HPI/location: left arm  Time present: 6 months   Painful lesion: No  Itching lesion: No  Enlarging lesion: No  Anything make it better or worse? no    Skin lesion  HPI/location: scalp/head  Time present: couple years   Painful lesion: No  Itching lesion: Yes  Enlarging lesion: No  Anything make it better or worse? no      History of skin cancer: No  History of precancers/actinic keratoses: No  History of biopsies:No  History of blistering/severe sunburns:Yes, Details: teenager   Family history of skin cancer:Yes, Details: mother type unknown  Family history of atypical moles:No      No Known Allergies     MEDICATIONS:  Medications relevant to specialty reviewed.     REVIEW OF SYSTEMS:   Positive for skin (see HPI)  Negative for fevers and chills       EXAM:  There were no vitals taken for this visit.  Constitutional: Well-developed, well-nourished, and in no distress.     A focused skin exam was performed including the affected areas of the LUE, scalp. Notable findings on exam today listed below and/or in assessment/plan.     Ill-defined erythematous gritty/scaly papules over the scalp/vertex and L forearm    IMPRESSION / PLAN:    1. Actinic keratosis  - NMSC/\"pre-cancer\" education/counseling     CRYOTHERAPY:  Risks (including, but not limited to: skin discoloration, redness, blister, blood blister, recurrence, need for further treatment, infection, scar) and benefits of cryotherapy discussed. Patient verbally agreed to proceed with treatment. 1 cryotherapy freeze thaw cycles of 10 seconds were applied to 6 lesions on areas as noted on exam with cryac. Patient tolerated procedure well. Aftercare instructions given--no specific care needed unless irritated during healing process, can apply Vaseline with small band-aid if needed.        Discussed risks associated " with LN2, Patient verbalized understanding and agrees with plan regarding the above            Please note that this dictation was created using voice recognition software. I have made every reasonable attempt to correct obvious errors, but I expect that there are errors of grammar and possibly content that I did not discover before finalizing the note.      Return to clinic in: Return for PRN. and as needed for any new or changing skin lesions.

## 2024-03-03 DIAGNOSIS — E11.69 TYPE 2 DIABETES MELLITUS WITH HYPERLIPIDEMIA (HCC): ICD-10-CM

## 2024-03-03 DIAGNOSIS — E78.5 TYPE 2 DIABETES MELLITUS WITH HYPERLIPIDEMIA (HCC): ICD-10-CM

## 2024-03-03 DIAGNOSIS — I10 ESSENTIAL HYPERTENSION: ICD-10-CM

## 2024-03-03 DIAGNOSIS — E78.5 DYSLIPIDEMIA: ICD-10-CM

## 2024-03-05 RX ORDER — TELMISARTAN 80 MG/1
80 TABLET ORAL
Qty: 90 TABLET | Refills: 3 | Status: SHIPPED | OUTPATIENT
Start: 2024-03-05

## 2024-03-05 RX ORDER — ATORVASTATIN CALCIUM 20 MG/1
20 TABLET, FILM COATED ORAL DAILY
Qty: 90 TABLET | Refills: 3 | Status: SHIPPED | OUTPATIENT
Start: 2024-03-05

## 2024-03-05 NOTE — TELEPHONE ENCOUNTER
Received request via: Patient    Was the patient seen in the last year in this department? Yes    Does the patient have an active prescription (recently filled or refills available) for medication(s) requested? Yes. Refill request has been refused in Epic. Contacted pharmacy and called in most recent prescription.        Does the patient have snf Plus and need 100 day supply (blood pressure, diabetes and cholesterol meds only)? Yes, quantity updated to 100 days

## 2024-04-12 ENCOUNTER — HOSPITAL ENCOUNTER (OUTPATIENT)
Dept: LAB | Facility: MEDICAL CENTER | Age: 64
End: 2024-04-12
Attending: INTERNAL MEDICINE
Payer: COMMERCIAL

## 2024-04-12 DIAGNOSIS — E78.5 DYSLIPIDEMIA: ICD-10-CM

## 2024-04-12 DIAGNOSIS — E78.5 TYPE 2 DIABETES MELLITUS WITH HYPERLIPIDEMIA (HCC): ICD-10-CM

## 2024-04-12 DIAGNOSIS — E11.69 TYPE 2 DIABETES MELLITUS WITH HYPERLIPIDEMIA (HCC): ICD-10-CM

## 2024-04-12 LAB
ALT SERPL-CCNC: 21 U/L (ref 2–50)
ANION GAP SERPL CALC-SCNC: 13 MMOL/L (ref 7–16)
BUN SERPL-MCNC: 15 MG/DL (ref 8–22)
CALCIUM SERPL-MCNC: 9.5 MG/DL (ref 8.5–10.5)
CHLORIDE SERPL-SCNC: 101 MMOL/L (ref 96–112)
CHOLEST SERPL-MCNC: 135 MG/DL (ref 100–199)
CO2 SERPL-SCNC: 23 MMOL/L (ref 20–33)
CREAT SERPL-MCNC: 1 MG/DL (ref 0.5–1.4)
CREAT UR-MCNC: 62.58 MG/DL
EST. AVERAGE GLUCOSE BLD GHB EST-MCNC: 128 MG/DL
FASTING STATUS PATIENT QL REPORTED: NORMAL
GFR SERPLBLD CREATININE-BSD FMLA CKD-EPI: 85 ML/MIN/1.73 M 2
GLUCOSE SERPL-MCNC: 137 MG/DL (ref 65–99)
HBA1C MFR BLD: 6.1 % (ref 4–5.6)
HDLC SERPL-MCNC: 53 MG/DL
LDLC SERPL CALC-MCNC: 70 MG/DL
MICROALBUMIN UR-MCNC: <1.2 MG/DL
MICROALBUMIN/CREAT UR: NORMAL MG/G (ref 0–30)
POTASSIUM SERPL-SCNC: 4.3 MMOL/L (ref 3.6–5.5)
SODIUM SERPL-SCNC: 137 MMOL/L (ref 135–145)
TRIGL SERPL-MCNC: 61 MG/DL (ref 0–149)

## 2024-04-12 PROCEDURE — 84460 ALANINE AMINO (ALT) (SGPT): CPT

## 2024-04-12 PROCEDURE — 36415 COLL VENOUS BLD VENIPUNCTURE: CPT

## 2024-04-12 PROCEDURE — 80048 BASIC METABOLIC PNL TOTAL CA: CPT

## 2024-04-12 PROCEDURE — 82043 UR ALBUMIN QUANTITATIVE: CPT

## 2024-04-12 PROCEDURE — 80061 LIPID PANEL: CPT

## 2024-04-12 PROCEDURE — 83036 HEMOGLOBIN GLYCOSYLATED A1C: CPT

## 2024-04-12 PROCEDURE — 82570 ASSAY OF URINE CREATININE: CPT

## 2024-05-13 ENCOUNTER — OFFICE VISIT (OUTPATIENT)
Dept: MEDICAL GROUP | Facility: PHYSICIAN GROUP | Age: 64
End: 2024-05-13
Payer: COMMERCIAL

## 2024-05-13 VITALS
HEIGHT: 67 IN | DIASTOLIC BLOOD PRESSURE: 78 MMHG | WEIGHT: 180.6 LBS | OXYGEN SATURATION: 98 % | TEMPERATURE: 99.3 F | HEART RATE: 62 BPM | BODY MASS INDEX: 28.35 KG/M2 | SYSTOLIC BLOOD PRESSURE: 116 MMHG

## 2024-05-13 DIAGNOSIS — Z12.5 SCREENING FOR MALIGNANT NEOPLASM OF PROSTATE: ICD-10-CM

## 2024-05-13 DIAGNOSIS — Z23 NEED FOR VACCINATION: ICD-10-CM

## 2024-05-13 DIAGNOSIS — E78.5 DYSLIPIDEMIA: Chronic | ICD-10-CM

## 2024-05-13 DIAGNOSIS — E78.5 TYPE 2 DIABETES MELLITUS WITH HYPERLIPIDEMIA (HCC): ICD-10-CM

## 2024-05-13 DIAGNOSIS — E55.9 VITAMIN D DEFICIENCY: Chronic | ICD-10-CM

## 2024-05-13 DIAGNOSIS — I10 PRIMARY HYPERTENSION: Chronic | ICD-10-CM

## 2024-05-13 DIAGNOSIS — E11.69 TYPE 2 DIABETES MELLITUS WITH HYPERLIPIDEMIA (HCC): ICD-10-CM

## 2024-05-13 PROCEDURE — 99214 OFFICE O/P EST MOD 30 MIN: CPT | Performed by: INTERNAL MEDICINE

## 2024-05-13 PROCEDURE — 3074F SYST BP LT 130 MM HG: CPT | Performed by: INTERNAL MEDICINE

## 2024-05-13 PROCEDURE — 3078F DIAST BP <80 MM HG: CPT | Performed by: INTERNAL MEDICINE

## 2024-05-13 RX ORDER — PROMETHAZINE HYDROCHLORIDE 25 MG/1
25 TABLET ORAL EVERY 6 HOURS PRN
Qty: 30 TABLET | Refills: 0 | Status: SHIPPED | OUTPATIENT
Start: 2024-05-13

## 2024-05-13 ASSESSMENT — PATIENT HEALTH QUESTIONNAIRE - PHQ9: CLINICAL INTERPRETATION OF PHQ2 SCORE: 0

## 2024-05-13 ASSESSMENT — FIBROSIS 4 INDEX: FIB4 SCORE: 2.14

## 2024-05-13 NOTE — ASSESSMENT & PLAN NOTE
Chronic ongoing condition.  Patient is being treated with atorvastatin 20 Mg daily.  Patient is tolerating medication well.  Lab test result discussed with the patient.

## 2024-05-13 NOTE — ASSESSMENT & PLAN NOTE
Chronic condition.  The patient has been taking vitamin D supplementation daily.  No new symptoms reported.

## 2024-05-13 NOTE — ASSESSMENT & PLAN NOTE
Chronic condition.  Patient presently being treated with metformin.  Patient tolerating medication well.  Denies significant hypoglycemia.

## 2024-05-13 NOTE — PROGRESS NOTES
PRIMARY CARE CLINIC VISIT        Chief Complaint   Patient presents with    Follow-Up      Follow-up diabetes  Hypertension  Hyperlipidemia  Vitamin D deficiency  Vaccination status  Blood test result  Medication review        History of Present Illness     Type 2 diabetes mellitus with hyperlipidemia (HCC)  Chronic condition.  Patient presently being treated with metformin.  Patient tolerating medication well.  Denies significant hypoglycemia.    Hypertension  Chronic condition.  The patient is taking Micardis 80 Mg daily.  Patient is tolerating medication well.    Dyslipidemia  Chronic ongoing condition.  Patient is being treated with atorvastatin 20 Mg daily.  Patient is tolerating medication well.  Lab test result discussed with the patient.    Vitamin D deficiency disease  Chronic condition.  The patient has been taking vitamin D supplementation daily.  No new symptoms reported.    BMI 28.0-28.9,adult  Chronic condition.  Discussed with the patient regarding diet, exercise, and lifestyle modification to help achieve and maintain healthy weight         Need for vaccination  Recommended Pt to get shingles vaccine from the pharmacy as this is not available in the office.      Current Outpatient Medications on File Prior to Visit   Medication Sig Dispense Refill    metFORMIN (GLUCOPHAGE) 500 MG Tab Take 2 Tablets by mouth every day. 90 Tablet 3    atorvastatin (LIPITOR) 20 MG Tab Take 1 Tablet by mouth every day. 90 Tablet 3    telmisartan (MICARDIS) 80 MG Tab Take 1 Tablet by mouth every day. 90 Tablet 3    aspirin 81 MG EC tablet tablet      vitamin D (CHOLECALCIFEROL) 1000 Unit Tab Take 1 Tab by mouth every day. 90 Tab 3     No current facility-administered medications on file prior to visit.        Allergies: Patient has no known allergies.    Current Outpatient Medications Ordered in Epic   Medication Sig Dispense Refill    promethazine (PHENERGAN) 25 MG Tab Take 1 Tablet by mouth every 6 hours as needed for  Nausea/Vomiting. 30 Tablet 0    metFORMIN (GLUCOPHAGE) 500 MG Tab Take 2 Tablets by mouth every day. 90 Tablet 3    atorvastatin (LIPITOR) 20 MG Tab Take 1 Tablet by mouth every day. 90 Tablet 3    telmisartan (MICARDIS) 80 MG Tab Take 1 Tablet by mouth every day. 90 Tablet 3    aspirin 81 MG EC tablet tablet      vitamin D (CHOLECALCIFEROL) 1000 Unit Tab Take 1 Tab by mouth every day. 90 Tab 3     No current Epic-ordered facility-administered medications on file.       Past Medical History:   Diagnosis Date    Hyperlipidemia     Hypertension        Past Surgical History:   Procedure Laterality Date    ELBOWPLASTY         Family History   Problem Relation Age of Onset    Cancer Father         Prostate Cancer    Lung Disease Father         COPD       Social History     Tobacco Use   Smoking Status Former    Current packs/day: 0.00    Types: Cigarettes    Quit date: 11/10/2001    Years since quittin.5   Smokeless Tobacco Former       Social History     Substance and Sexual Activity   Alcohol Use Not Currently    Alcohol/week: 8.4 oz    Types: 14 Glasses of wine per week       Review of systems  As per HPI above. All other systems reviewed and negative.      Past Medical, Social, and Family history reviewed and updated in [facility]       LAB DATA:    Lab Results   Component Value Date/Time    HBA1C 6.1 (H) 2024 06:39 AM    HBA1C 5.5 10/24/2023 06:23 AM    HBA1C 6.2 (H) 2023 07:32 AM       Lab Results   Component Value Date/Time    WBC 5.4 2023 07:32 AM    HEMOGLOBIN 16.1 2023 07:32 AM    HEMATOCRIT 49.3 2023 07:32 AM    MCV 91.8 2023 07:32 AM    PLATELETCT 164 2023 07:32 AM       Lab Results   Component Value Date/Time    SODIUM 137 2024 06:39 AM    POTASSIUM 4.3 2024 06:39 AM    GLUCOSE 137 (H) 2024 06:39 AM    BUN 15 2024 06:39 AM    CREATININE 1.00 2024 06:39 AM       Lab Results   Component Value Date/Time    CHOLSTRLTOT 135 2024 06:39 AM  "   TRIGLYCERIDE 61 04/12/2024 06:39 AM    HDL 53 04/12/2024 06:39 AM    LDL 70 04/12/2024 06:39 AM       Lab Results   Component Value Date/Time    KATHERINE 21 04/12/2024 06:39 AM          Objective     /78 (BP Location: Right arm, Patient Position: Sitting, BP Cuff Size: Adult)   Pulse 62   Temp 37.4 °C (99.3 °F) (Temporal)   Ht 1.702 m (5' 7\")   Wt 81.9 kg (180 lb 9.6 oz)   SpO2 98%    Body mass index is 28.29 kg/m².    General: alert in no apparent distress.  Cardiovascular: regular rate and rhythm  Pulmonary: lungs : no wheezing   Gastrointestinal: BS present.   Monofilament testing with a 10 gram force: sensation intact: intact bilaterally  Visual Inspection: Feet without maceration, ulcers, fissures.  Pedal pulses: intact bilaterally     Assessment and Plan     1. Type 2 diabetes mellitus with hyperlipidemia (HCC)  Chronic condition.  Excellent Controlled.  A1c 6.1%.  Continue metformin 500 mg daily.    Discussed with the patient goals for Diabetes management:  -HbA1C < 7% /  Fasting BG   /  2 hrs postprandial  - 180    Pt's education:   -Discussed with potential microvascular/macrovascular complications of diabetes  -The importance of increasing physical activity to improve diabetes control  discussed with the patient.   -Patient was also educated on changing diet and making better choices to help control blood sugar.          - POCT Retinal Eye Exam  - Diabetic Monofilament LE Exam  - HEMOGLOBIN A1C; Future  - Basic Metabolic Panel; Future    2. Primary hypertension  - CBC WITH DIFFERENTIAL; Future  - MICROALBUMIN CREAT RATIO URINE; Future  Chronic stable condition.  Continue Micardis 80 Mg daily    3. Dyslipidemia  - ALANINE AMINO-TRANS; Future  - Lipid Profile; Future  Chronic stable condition continue atorvastatin 20 Mg daily.  Lab test result discussed with the patient    4. Vitamin D deficiency disease  - VITAMIN D,25 HYDROXY (DEFICIENCY); Future  Chronic stable condition.  " Continue vitamin D3 1000 unit daily.  Repeat lab test next visit      5. BMI 28.0-28.9,adult  Chronic condition.  Advised the patient diet and lifestyle modification.  Encouraged the patient to maintain ideal weight.    6. Screening for malignant neoplasm of prostate  - PROSTATE SPECIFIC AG SCREENING; Future    7. Need for vaccination  Recommend patient to get shingle vaccine at the local pharmacy.            Attestation: I spent:   33  minutes -    That includes time for chart review before the visit, the actual patient visit, and time spent on documentation in EMR after the visit.  Chart review/prep, review of other providers' records, imaging/lab review, face-to-face time for history/examination, pt's counseling/education, ordering, prescribing,  review of results/meds/ treatment plan with patient, and care coordination.             Please note that this dictation was created using voice recognition software. I have made every reasonable attempt to correct obvious errors, but I expect that there are errors of grammar and possibly content that I did not discover before finalizing the note.    Donnie Herman MD  Internal Medicine  [facility] care [facility]

## 2024-05-13 NOTE — LETTER
[email or web address]  Donnie Herman M.D.  [address]  [place] [address]  Fax: [phone]   Authorization for Release/Disclosure of   Protected Health Information   Name: MILTON GARZA : 1960 SSN: [id number]   Address: [address] [place] [id number] Phone:    There are no phone numbers on file.   I authorize the entity listed below to release/disclose the PHI below to:   [facility]/Donnie Herman M.D. and Donnie Herman M.D.   Provider or Entity Name: [facility]   City, State, [facility]   Phone:      Fax:[phone]     Reason for request: continuity of care   Information to be released:    [  ] LAST COLONOSCOPY,  including any PATH REPORT and follow-up  [  ] LAST FIT/COLOGUARD RESULT [  ] LAST DEXA  [  ] LAST MAMMOGRAM  [  ] LAST PAP  [  ] LAST LABS [ xx ] RETINA EXAM REPORT  [  ] IMMUNIZATION RECORDS  [  ] Release all info      [  ] Check here and initial the line next to each item to release ALL health information INCLUDING  _____ Care and treatment for drug and / or alcohol abuse  _____ HIV testing, infection status, or AIDS  _____ Genetic Testing    DATES OF SERVICE OR TIME PERIOD TO BE DISCLOSED: _____________  I understand and acknowledge that:  * This Authorization may be revoked at any time by you in writing, except if your health information has already been used or disclosed.  * Your health information that will be used or disclosed as a result of you signing this authorization could be re-disclosed by the recipient. If this occurs, your re-disclosed health information may no longer be protected by State or Federal laws.  * You may refuse to sign this Authorization. Your refusal will not affect your ability to obtain treatment.  * This Authorization becomes effective upon signing and will  on (date) __________.      If no date is indicated, this Authorization will  one (1) year from the signature date.    Name: Milton Marcos Lian  Continuity of Care Date:    5/13/2024     PLEASE FAX REQUESTED RECORDS BACK TO: [phone]

## 2024-05-13 NOTE — ASSESSMENT & PLAN NOTE
Chronic condition.  The patient is taking Micardis 80 Mg daily.  Patient is tolerating medication well.

## 2024-09-18 DIAGNOSIS — I10 ESSENTIAL HYPERTENSION: ICD-10-CM

## 2024-09-18 RX ORDER — TELMISARTAN 80 MG/1
80 TABLET ORAL
Qty: 90 TABLET | Refills: 0 | Status: SHIPPED | OUTPATIENT
Start: 2024-09-18

## 2024-09-18 NOTE — TELEPHONE ENCOUNTER
Received request via: Patient    Was the patient seen in the last year in this department? Yes    Does the patient have an active prescription (recently filled or refills available) for medication(s) requested? No    Pharmacy Name: [facility]     Does the patient have retirement Plus and need 100-day supply? (This applies to ALL medications) Patient does not have SCP

## 2024-11-09 DIAGNOSIS — E78.5 TYPE 2 DIABETES MELLITUS WITH HYPERLIPIDEMIA (HCC): ICD-10-CM

## 2024-11-09 DIAGNOSIS — E11.69 TYPE 2 DIABETES MELLITUS WITH HYPERLIPIDEMIA (HCC): ICD-10-CM

## 2024-11-11 NOTE — TELEPHONE ENCOUNTER
Received request via: Patient    Was the patient seen in the last year in this department? Yes    Does the patient have an active prescription (recently filled or refills available) for medication(s) requested? No        Does the patient have long-term Plus and need 100-day supply? (This applies to ALL medications) Patient does not have SCP

## 2024-11-12 ENCOUNTER — HOSPITAL ENCOUNTER (OUTPATIENT)
Dept: LAB | Facility: MEDICAL CENTER | Age: 64
End: 2024-11-12
Attending: INTERNAL MEDICINE
Payer: COMMERCIAL

## 2024-11-12 DIAGNOSIS — E55.9 VITAMIN D DEFICIENCY: Chronic | ICD-10-CM

## 2024-11-12 DIAGNOSIS — I10 PRIMARY HYPERTENSION: Chronic | ICD-10-CM

## 2024-11-12 DIAGNOSIS — Z12.5 SCREENING FOR MALIGNANT NEOPLASM OF PROSTATE: ICD-10-CM

## 2024-11-12 DIAGNOSIS — E11.69 TYPE 2 DIABETES MELLITUS WITH HYPERLIPIDEMIA (HCC): ICD-10-CM

## 2024-11-12 DIAGNOSIS — E78.5 TYPE 2 DIABETES MELLITUS WITH HYPERLIPIDEMIA (HCC): ICD-10-CM

## 2024-11-12 DIAGNOSIS — I10 ESSENTIAL HYPERTENSION: ICD-10-CM

## 2024-11-12 DIAGNOSIS — E78.5 DYSLIPIDEMIA: Chronic | ICD-10-CM

## 2024-11-12 LAB
BASOPHILS # BLD AUTO: 0.9 % (ref 0–1.8)
BASOPHILS # BLD: 0.05 K/UL (ref 0–0.12)
EOSINOPHIL # BLD AUTO: 0.12 K/UL (ref 0–0.51)
EOSINOPHIL NFR BLD: 2.1 % (ref 0–6.9)
ERYTHROCYTE [DISTWIDTH] IN BLOOD BY AUTOMATED COUNT: 42 FL (ref 35.9–50)
EST. AVERAGE GLUCOSE BLD GHB EST-MCNC: 137 MG/DL
HBA1C MFR BLD: 6.4 % (ref 4–5.6)
HCT VFR BLD AUTO: 47.4 % (ref 42–52)
HGB BLD-MCNC: 16.3 G/DL (ref 14–18)
IMM GRANULOCYTES # BLD AUTO: 0.04 K/UL (ref 0–0.11)
IMM GRANULOCYTES NFR BLD AUTO: 0.7 % (ref 0–0.9)
LYMPHOCYTES # BLD AUTO: 1.5 K/UL (ref 1–4.8)
LYMPHOCYTES NFR BLD: 26 % (ref 22–41)
MCH RBC QN AUTO: 32.4 PG (ref 27–33)
MCHC RBC AUTO-ENTMCNC: 34.4 G/DL (ref 32.3–36.5)
MCV RBC AUTO: 94.2 FL (ref 81.4–97.8)
MONOCYTES # BLD AUTO: 0.52 K/UL (ref 0–0.85)
MONOCYTES NFR BLD AUTO: 9 % (ref 0–13.4)
NEUTROPHILS # BLD AUTO: 3.55 K/UL (ref 1.82–7.42)
NEUTROPHILS NFR BLD: 61.3 % (ref 44–72)
NRBC # BLD AUTO: 0 K/UL
NRBC BLD-RTO: 0 /100 WBC (ref 0–0.2)
PLATELET # BLD AUTO: 161 K/UL (ref 164–446)
PMV BLD AUTO: 11.7 FL (ref 9–12.9)
RBC # BLD AUTO: 5.03 M/UL (ref 4.7–6.1)
WBC # BLD AUTO: 5.8 K/UL (ref 4.8–10.8)

## 2024-11-12 PROCEDURE — 84153 ASSAY OF PSA TOTAL: CPT

## 2024-11-12 PROCEDURE — 85025 COMPLETE CBC W/AUTO DIFF WBC: CPT

## 2024-11-12 PROCEDURE — 83036 HEMOGLOBIN GLYCOSYLATED A1C: CPT

## 2024-11-12 PROCEDURE — 82570 ASSAY OF URINE CREATININE: CPT

## 2024-11-12 PROCEDURE — 80061 LIPID PANEL: CPT

## 2024-11-12 PROCEDURE — 36415 COLL VENOUS BLD VENIPUNCTURE: CPT

## 2024-11-12 PROCEDURE — 82043 UR ALBUMIN QUANTITATIVE: CPT

## 2024-11-12 PROCEDURE — 84460 ALANINE AMINO (ALT) (SGPT): CPT

## 2024-11-12 PROCEDURE — 80048 BASIC METABOLIC PNL TOTAL CA: CPT

## 2024-11-12 PROCEDURE — 82306 VITAMIN D 25 HYDROXY: CPT

## 2024-11-13 ENCOUNTER — OFFICE VISIT (OUTPATIENT)
Dept: MEDICAL GROUP | Facility: PHYSICIAN GROUP | Age: 64
End: 2024-11-13
Payer: COMMERCIAL

## 2024-11-13 VITALS
HEART RATE: 72 BPM | SYSTOLIC BLOOD PRESSURE: 120 MMHG | OXYGEN SATURATION: 95 % | TEMPERATURE: 98.3 F | HEIGHT: 67 IN | WEIGHT: 190 LBS | BODY MASS INDEX: 29.82 KG/M2 | DIASTOLIC BLOOD PRESSURE: 74 MMHG

## 2024-11-13 DIAGNOSIS — E55.9 VITAMIN D DEFICIENCY: ICD-10-CM

## 2024-11-13 DIAGNOSIS — E11.69 TYPE 2 DIABETES MELLITUS WITH HYPERLIPIDEMIA (HCC): Chronic | ICD-10-CM

## 2024-11-13 DIAGNOSIS — Z23 NEED FOR VACCINATION: ICD-10-CM

## 2024-11-13 DIAGNOSIS — E78.5 TYPE 2 DIABETES MELLITUS WITH HYPERLIPIDEMIA (HCC): Chronic | ICD-10-CM

## 2024-11-13 DIAGNOSIS — E78.5 DYSLIPIDEMIA: Chronic | ICD-10-CM

## 2024-11-13 DIAGNOSIS — I10 PRIMARY HYPERTENSION: Chronic | ICD-10-CM

## 2024-11-13 LAB
25(OH)D3 SERPL-MCNC: 49 NG/ML (ref 30–100)
ALT SERPL-CCNC: 49 U/L (ref 2–50)
ANION GAP SERPL CALC-SCNC: 13 MMOL/L (ref 7–16)
BUN SERPL-MCNC: 12 MG/DL (ref 8–22)
CALCIUM SERPL-MCNC: 9.7 MG/DL (ref 8.5–10.5)
CHLORIDE SERPL-SCNC: 100 MMOL/L (ref 96–112)
CHOLEST SERPL-MCNC: 175 MG/DL (ref 100–199)
CO2 SERPL-SCNC: 25 MMOL/L (ref 20–33)
CREAT SERPL-MCNC: 1.04 MG/DL (ref 0.5–1.4)
CREAT UR-MCNC: 91.66 MG/DL
FASTING STATUS PATIENT QL REPORTED: NORMAL
GFR SERPLBLD CREATININE-BSD FMLA CKD-EPI: 81 ML/MIN/1.73 M 2
GLUCOSE SERPL-MCNC: 154 MG/DL (ref 65–99)
HDLC SERPL-MCNC: 67 MG/DL
LDLC SERPL CALC-MCNC: 82 MG/DL
MICROALBUMIN UR-MCNC: 2.7 MG/DL
MICROALBUMIN/CREAT UR: 29 MG/G (ref 0–30)
POTASSIUM SERPL-SCNC: 4.5 MMOL/L (ref 3.6–5.5)
PSA SERPL DL<=0.01 NG/ML-MCNC: 1.54 NG/ML (ref 0–4)
SODIUM SERPL-SCNC: 138 MMOL/L (ref 135–145)
TRIGL SERPL-MCNC: 128 MG/DL (ref 0–149)

## 2024-11-13 PROCEDURE — 3078F DIAST BP <80 MM HG: CPT | Performed by: INTERNAL MEDICINE

## 2024-11-13 PROCEDURE — 92250 FUNDUS PHOTOGRAPHY W/I&R: CPT | Mod: 26 | Performed by: INTERNAL MEDICINE

## 2024-11-13 PROCEDURE — 3074F SYST BP LT 130 MM HG: CPT | Performed by: INTERNAL MEDICINE

## 2024-11-13 PROCEDURE — 99214 OFFICE O/P EST MOD 30 MIN: CPT | Performed by: INTERNAL MEDICINE

## 2024-11-13 RX ORDER — TELMISARTAN 80 MG/1
80 TABLET ORAL
Qty: 90 TABLET | Refills: 3 | Status: SHIPPED | OUTPATIENT
Start: 2024-11-13

## 2024-11-13 RX ORDER — TELMISARTAN 80 MG/1
80 TABLET ORAL DAILY
Qty: 90 TABLET | Refills: 3 | OUTPATIENT
Start: 2024-11-13

## 2024-11-13 RX ORDER — ATORVASTATIN CALCIUM 20 MG/1
20 TABLET, FILM COATED ORAL DAILY
Qty: 90 TABLET | Refills: 3 | Status: SHIPPED | OUTPATIENT
Start: 2024-11-13

## 2024-11-13 ASSESSMENT — FIBROSIS 4 INDEX: FIB4 SCORE: 1.43

## 2024-11-13 NOTE — ASSESSMENT & PLAN NOTE
This is a chronic condition.  Patient presently taking metformin.  Patient denies significant hypoglycemia.  Recent A1c 6.4%.

## 2024-11-13 NOTE — ASSESSMENT & PLAN NOTE
Chronic ongoing condition.  Patient taking Micardis 80 Mg daily.  Tolerating medication well.  Patient denies chest pain shortness of breath palpitation or near syncope.

## 2024-11-13 NOTE — ASSESSMENT & PLAN NOTE
This is a chronic condition.  The patient currently taking vitamin D supplementation.  Patient tolerating medication well.  No new symptoms reported.

## 2024-11-13 NOTE — ASSESSMENT & PLAN NOTE
Chronic condition.  Patient being treated with atorvastatin.  Recent lipid panel result discussed with the patient.  Patient tolerating medication well.

## 2024-11-13 NOTE — PROGRESS NOTES
PRIMARY CARE CLINIC VISIT        Chief Complaint   Patient presents with    Follow-Up     Check up       Follow-up diabetes  Hypertension  Hyperlipidemia  BMI  Vitamin D deficiency  Lab test results  Medication review and refills follow-up        History of Present Illness     Type 2 diabetes mellitus with hyperlipidemia (HCC)  This is a chronic condition.  Patient presently taking metformin.  Patient denies significant hypoglycemia.  Recent A1c 6.4%.    Hypertension  Chronic ongoing condition.  Patient taking Micardis 80 Mg daily.  Tolerating medication well.  Patient denies chest pain shortness of breath palpitation or near syncope.    Dyslipidemia  Chronic condition.  Patient being treated with atorvastatin.  Recent lipid panel result discussed with the patient.  Patient tolerating medication well.    Vitamin D deficiency  This is a chronic condition.  The patient currently taking vitamin D supplementation.  Patient tolerating medication well.  No new symptoms reported.    BMI 29.0-29.9,adult  Body mass index is 29.76 kg/m².     Chronic condition.  Recommend pt to follow a healthy , well balance diet  Pt to continue with regular exercise activity and to maintain ideal weight.     Need for vaccination  Recommended Pt to get shingles vaccine from the pharmacy as this is not available in the office.      Current Outpatient Medications on File Prior to Visit   Medication Sig Dispense Refill    metFORMIN (GLUCOPHAGE) 500 MG Tab Take 2 Tablets by mouth every day. 90 Tablet 3    promethazine (PHENERGAN) 25 MG Tab Take 1 Tablet by mouth every 6 hours as needed for Nausea/Vomiting. 30 Tablet 0    aspirin 81 MG EC tablet tablet      vitamin D (CHOLECALCIFEROL) 1000 Unit Tab Take 1 Tab by mouth every day. 90 Tab 3     No current facility-administered medications on file prior to visit.        Allergies: Patient has no known allergies.    Current Outpatient Medications Ordered in Epic   Medication Sig Dispense Refill     telmisartan (MICARDIS) 80 MG Tab Take 1 Tablet by mouth every day. 90 Tablet 3    atorvastatin (LIPITOR) 20 MG Tab Take 1 Tablet by mouth every day. 90 Tablet 3    metFORMIN (GLUCOPHAGE) 500 MG Tab Take 2 Tablets by mouth every day. 90 Tablet 3    promethazine (PHENERGAN) 25 MG Tab Take 1 Tablet by mouth every 6 hours as needed for Nausea/Vomiting. 30 Tablet 0    aspirin 81 MG EC tablet tablet      vitamin D (CHOLECALCIFEROL) 1000 Unit Tab Take 1 Tab by mouth every day. 90 Tab 3     No current Epic-ordered facility-administered medications on file.       Past Medical History:   Diagnosis Date    Hyperlipidemia     Hypertension        Past Surgical History:   Procedure Laterality Date    ELBOWPLASTY         Family History   Problem Relation Age of Onset    Cancer Father         Prostate Cancer    Lung Disease Father         COPD       Social History     Tobacco Use   Smoking Status Former    Current packs/day: 0.00    Types: Cigarettes    Quit date: 11/10/2001    Years since quittin.0   Smokeless Tobacco Former       Social History     Substance and Sexual Activity   Alcohol Use Not Currently    Alcohol/week: 8.4 oz    Types: 14 Glasses of wine per week       Review of systems  As per HPI above. All other systems reviewed and negative.      Past Medical, Social, and Family history reviewed and updated in [facility]       LAB DATA:     I have independently reviewed / interpreted labs    Lab Results   Component Value Date/Time    HBA1C 6.4 (H) 2024 06:32 AM    HBA1C 6.1 (H) 2024 06:39 AM    HBA1C 5.5 10/24/2023 06:23 AM        Lab Results   Component Value Date/Time    WBC 5.8 2024 06:32 AM    HEMOGLOBIN 16.3 2024 06:32 AM    HEMATOCRIT 47.4 2024 06:32 AM    MCV 94.2 2024 06:32 AM    PLATELETCT 161 (L) 2024 06:32 AM       Lab Results   Component Value Date/Time    SODIUM 138 2024 06:32 AM    POTASSIUM 4.5 2024 06:32 AM    GLUCOSE 154 (H) 2024 06:32 AM    BUN 12  "11/12/2024 06:32 AM    CREATININE 1.04 11/12/2024 06:32 AM       Lab Results   Component Value Date/Time    CHOLSTRLTOT 175 11/12/2024 06:32 AM    TRIGLYCERIDE 128 11/12/2024 06:32 AM    HDL 67 11/12/2024 06:32 AM    LDL 82 11/12/2024 06:32 AM       Lab Results   Component Value Date/Time    ALTSGPT 49 11/12/2024 06:32 AM          Objective     /74 (BP Location: Right arm, Patient Position: Sitting, BP Cuff Size: Adult)   Pulse 72   Temp 36.8 °C (98.3 °F) (Temporal)   Ht 1.702 m (5' 7\")   Wt 86.2 kg (190 lb)   SpO2 95%    Body mass index is 29.76 kg/m².    General: alert in no apparent distress.  Cardiovascular: regular rate and rhythm  Pulmonary: lungs : no wheezing   Gastrointestinal: BS present.       Assessment and Plan     1. Type 2 diabetes mellitus with hyperlipidemia (HCC)  Chronic stable condition.  A1c 6.4%.  Continue metformin 1000 Mg daily.  Discussed with the patient goals for Diabetes management:  -HbA1C < 7% /  Fasting BG   /  2 hrs postprandial  - 180    Pt's education:   -Discussed with potential microvascular/macrovascular complications of diabetes  -The importance of increasing physical activity to improve diabetes control  discussed with the patient.   -Patient was also educated on changing diet and making better choices to help control blood sugar.          - POCT Retinal Eye Exam  - HEMOGLOBIN A1C; Future  - Basic Metabolic Panel; Future    2. Primary hypertension  - MICROALBUMIN CREAT RATIO URINE; Future  Chronic stable continue Micardis 80 Mg daily.  Continue to monitor blood pressure on regular basis at home.    3. Dyslipidemia  - atorvastatin (LIPITOR) 20 MG Tab; Take 1 Tablet by mouth every day.  Dispense: 90 Tablet; Refill: 3  - ALANINE AMINO-TRANS; Future  - Lipid Profile; Future  Chronic stable.  Continue atorvastatin 20 Mg daily    4. Vitamin D deficiency  This is a chronic and stable condition.  Continue vitamin D 1000 unit daily.  Lab test result discussed with " the patient    5. BMI 29.0-29.9,adult  Chronic condition.  Uncontrolled.  Recommend diet and lifestyle modification.  Encouraged patient to lose some weight    6. Need for vaccination  .  Patient to get shingle vaccine at the local pharmacy.                    Please note that this dictation was created using voice recognition software. I have made every reasonable attempt to correct obvious errors, but I expect that there are errors of grammar and possibly content that I did not discover before finalizing the note.    Donnie Herman MD  Internal Medicine  [facility]

## 2024-11-14 LAB — RETINAL SCREEN: NEGATIVE

## 2024-11-18 ENCOUNTER — TELEPHONE (OUTPATIENT)
Dept: MEDICAL GROUP | Facility: PHYSICIAN GROUP | Age: 64
End: 2024-11-18
Payer: COMMERCIAL

## 2024-11-18 DIAGNOSIS — E11.69 TYPE 2 DIABETES MELLITUS WITH HYPERLIPIDEMIA (HCC): ICD-10-CM

## 2024-11-18 DIAGNOSIS — E78.5 TYPE 2 DIABETES MELLITUS WITH HYPERLIPIDEMIA (HCC): ICD-10-CM

## 2024-11-18 NOTE — TELEPHONE ENCOUNTER
[facility] - [place] - [facility] [address]  [place] [address]  Phone: [phone] Fax: [phone]    Pharmacy asking to clarify quantity and directions for metformin

## 2024-12-02 DIAGNOSIS — E78.5 TYPE 2 DIABETES MELLITUS WITH HYPERLIPIDEMIA (HCC): ICD-10-CM

## 2024-12-02 DIAGNOSIS — E11.69 TYPE 2 DIABETES MELLITUS WITH HYPERLIPIDEMIA (HCC): ICD-10-CM

## 2024-12-03 NOTE — TELEPHONE ENCOUNTER
Received request via: Patient    Was the patient seen in the last year in this department? Yes    Does the patient have an active prescription (recently filled or refills available) for medication(s) requested? No        Does the patient have FDC Plus and need 100-day supply? (This applies to ALL medications) Patient does not have SCP

## 2025-01-09 ENCOUNTER — PATIENT MESSAGE (OUTPATIENT)
Dept: MEDICAL GROUP | Facility: PHYSICIAN GROUP | Age: 65
End: 2025-01-09
Payer: COMMERCIAL

## 2025-01-15 DIAGNOSIS — I10 PRIMARY HYPERTENSION: Chronic | ICD-10-CM

## 2025-01-15 RX ORDER — TELMISARTAN 80 MG/1
80 TABLET ORAL
Qty: 90 TABLET | Refills: 3 | Status: SHIPPED | OUTPATIENT
Start: 2025-01-15

## 2025-05-13 DIAGNOSIS — E78.5 DYSLIPIDEMIA: Chronic | ICD-10-CM

## 2025-05-13 NOTE — TELEPHONE ENCOUNTER
Received request via: Patient    Was the patient seen in the last year in this department? Yes    Does the patient have an active prescription (recently filled or refills available) for medication(s) requested? Yes. Pharmacy change    Pharmacy Name:   [facility] - [place], [place] - [address] [facility]  [address] [facility] [address]  Phone: [phone] Fax: [phone]        Does the patient have [facility] Plus and need 100-day supply? (This applies to ALL medications) Patient does not have SCP

## 2025-05-19 RX ORDER — ATORVASTATIN CALCIUM 20 MG/1
20 TABLET, FILM COATED ORAL DAILY
Qty: 90 TABLET | Refills: 0 | Status: SHIPPED | OUTPATIENT
Start: 2025-05-19